# Patient Record
Sex: FEMALE | Race: WHITE | Employment: UNEMPLOYED | URBAN - METROPOLITAN AREA
[De-identification: names, ages, dates, MRNs, and addresses within clinical notes are randomized per-mention and may not be internally consistent; named-entity substitution may affect disease eponyms.]

---

## 2022-02-16 ENCOUNTER — HOSPITAL ENCOUNTER (OUTPATIENT)
Dept: PHYSICAL THERAPY | Age: 27
Discharge: HOME | End: 2022-02-16
Payer: OTHER GOVERNMENT

## 2022-02-16 PROCEDURE — 97535 SELF CARE MNGMENT TRAINING: CPT

## 2022-02-16 PROCEDURE — 97162 PT EVAL MOD COMPLEX 30 MIN: CPT

## 2022-02-16 NOTE — PROGRESS NOTES
[facility]  [address] [place], [address]  Ph [phone]  Fx [phone]    Plan of Care/ Statement of Necessity for Physical Therapy Services    Patient name: Juan Carlos Bennett Start of Care: 2022   Referral source: Lena Cook FNP-C : 1995    Medical Diagnosis: Stress incontinence (female) (male) [N39.3]   Onset Date:approximately 4 months ago    Treatment Diagnosis: Stress incontinence                                            ICD-10: N39.3   Prior Hospitalization: see medical history Provider#: [id number]   Medications: Verified on Patient summary List    Comorbidities: 3 pregnancies/ 2 vaginal (dtr 4 and son 18 months) and episiotomy, currently 20 weeks into 3rd pregnancies, wisdom teeth removal,    Prior Level of Function: Active lifestyle no urinary issues      The Plan of Care and following information is based on the information from the initial evaluation. Assessment/ key information: Patient is a 31year old female presenting to Physical Therapy with c/o stress and possible urge urinary incontinence which is limiting ability function at previous level. Patient has minimal  pain complaints with palpation to left SIJ. Patient is currently 20 weeks into her third pregnancy. Patient presents with decreased strength of postural stabilizers. Patient presents with fair understanding of bladder and bowel anatomy/function, dietary irritants, and urge suppression. I feel patient would benefit from skilled therapeutic intervention to optimize highest functional level possible.        Evaluation Complexity History HIGH Complexity :3+ comorbidities / personal factors will impact the outcome/ POC ; Examination HIGH Complexity : 4+ Standardized tests and measures addressing body structure, function, activity limitation and / or participation in recreation  ;Presentation MEDIUM Complexity : Evolving with changing characteristics  ; Clinical Decision Making MEDIUM Complexity : FOTO score of 26-74 : FOTO score = an established functional score where 100 = no disability  Overall Complexity Rating: MEDIUM    Problem List: decrease ROM, decrease strength, decrease ADL/ functional abilitiies, decrease activity tolerance and decrease flexibility/ joint mobility   Treatment Plan may include any combination of the following: Therapeutic exercise, Therapeutic activities, Neuromuscular re-education, Physical agent/modality, Gait/balance training, Manual therapy, Patient education, Self Care training and Functional mobility training  Patient / Family readiness to learn indicated by: asking questions, trying to perform skills and interest  Persons(s) to be included in education: patient (P)  Barriers to Learning/Limitations: None  Measures taken if barriers to learning: none  Patient Goal (s): not feel that I'm going to constantly pee on myself  Patient Self Reported Health Status: excellent  Rehabilitation Potential: excellent    Short term goals: To be achieved in 4 weeks:  Patient will demonstrate proper dietary/fluid habits and urge suppression strategies that promote bladder and bowel health to aid in management of urinary urgency and incontinence. Status at eval: Patient consuming sufficient water but some irritants and unaware of bladder fitness, dietary irritants and urge suppression strategies.      Patient will report improvement in UI complaints evidence by a decrease in pad usage and/or amount of leakage by 50% to improve QOL. Status at eval: Patient using 2-3 pads (incontinence) per day, generally drop or two but occasionally wet  (amount of leakage).      Patient will be able to maintain pelvic floor contraction for 5 seconds, 5 repetitions with no accessory substitution or breath holding. Status at eval:Deferred to next appointment     s:  To be achieved in 5 weeks:  Patient will report bladder continence 75% of the time with cough/sneeze/laugh and walking to the toilet.   Status at eval: patient stress and urgency incontinence 3 times  per day     Patient will demonstrate the ability to maintain a pelvic floor contraction  for 10 seconds, 10 repetitions. Tested externally at the levator ani  Status at eval: deferred to the next appointment     Patient will demonstrate improvement of current complaints evidenced by a 13 point  improvement in FOTO, Pelvic functional disability index score  Status at Eval: Pelvic functional disability index 51     Patient will demonstrate independence with management tools and exercise program that are beneficial for current condition in order to feel comfortable with Pelvic floor PT D/C and not fear exacerbation of current condition or symptoms returning. Status at eval : patient  unaware of what activities to avoid to avoid exacerbation of current condition       Frequency / Duration: Patient to be seen 1 times per week for 12 weeks. Patient/ Caregiver education and instruction: Diagnosis, prognosis, self care, activity modification and exercises   [x]  Plan of care has been reviewed with PTA    Certification Period: demian Hooker, PT 2/16/2022 3:38 PM    ________________________________________________________________________    I certify that the above Therapy Services are being furnished while the patient is under my care. I agree with the treatment plan and certify that this therapy is necessary.     Physician's Signature:_____________________Date:____________TIME:________                                      FLORENCIO Dior      ** Signature, Date and Time must be completed for valid certification **  Please sign and return to [facility] [address]  [facility], [address]  Ph [phone]  Fx [phone]

## 2022-02-16 NOTE — PROGRESS NOTES
Physical Therapy Evaluation        Patient Name: Ramiro Kimbrough  Date:2022  : 1995  [x]  Patient  Verified  Payor:  / Plan: [facility] REGION / Product Type: [employer]ANDN[employer] /    In time:1015  Out time:1100  Total Treatment Time (min): 39  Visit #: 1 of 12    Medicare/BCBS Only   Total Timed Codes (min):  15 1:1 Treatment Time:  30       Treatment Area: Stress incontinence (female) (male) [N39.3]    SUBJECTIVE  Pain Level (0-10 scale): 0/10  []constant []intermittent []improving []worsening []no change since onset    Any medication changes, allergies to medications, adverse drug reactions, diagnosis change, or new procedure performed?: [x] No    [] Yes (see summary sheet for update)  Subjective functional status/changes:     PLOF:  Active lifestyle no urinary issues  Limitations to PLOF:  Feels like she needs to wear an incontinence pad in public, concern about not knowning where the bathrooms are, irritated vaginal tissue from wiping  Mechanism of Injury: pregnancy  Current symptoms/Complaints: constant feeling that she is going to urinate, leaks with laugh/sneeze/cough and lifting; isn't able to fully void  Previous Treatment/Compliance: no  PMHx/Surgical Hx: 3 pregnancies/ 2 vaginal (dtr 4 and son 18 months) and episiotomy, currently 20 weeks into 3rd pregnancies, wisdom teeth removal,   Work Hx: stays home with the child  Living Situation: lives with  and two children  Pt Goals: not feel that I'm going to constantly pee on myself  Barriers: []pain []financial []time []transportation []other  Motivation: very  Substance use: []Alcohol []Tobacco []other:   Cognition: A & O x 4   Other:    Current urinary complaint  leaks with activity (stress induced)  leaks with urgency  leaks during sleep  urinary frequency  urinary urgency  sensation of incomplete bladder emptying intermittently  stress incontinence  urge incontinence    Bladder complaint longevity:  less than 6 months    Bladder symptom progression:  worsened    Frequency of UI: 3 times per day    Pad use:  incontinence pads: 2- 3 per day    Pad wetness when changed:  drop or two, sometimes wet    Daytime urinary frequency:  Every 1 hour(s) during the day    Nocturia:  2-3x/ night    Patient has failed previous pelvic floor muscle training?   [] Yes    [x] No    Bowel function:  Regular BM, 5-7 per week  Stool Type (Bradford): 3-4  Toileting position/modifications: no    Fecal Incontinence present? no    Pain complaint:  vaginal pain: opening (interoitus) during intercourse    Pain scale:  3-6/10    Pain description:  irritation due to frequent urination and need to wipe    Diet: healthy diet close to the My Plate    Fluid intake:    Fluid  Amount per day   Water   80 ounces   Caffeine  occasional sweet tea   Alcohol  none    Milk  16 ounces         Physical Exam Objective Findings:    Gait:  [x] Normal     [] Abnormal:    Active Movements: [] N/A   [] Too acute   [] Other:  ROM % AROM Comments:pain, area   Forward flexion 40-60 WNL    Extension 20-30 WNL    SideBend right 20-30 WNL    SideBend left 20-30 WNL    Rotation right 5-10 WNL    Rotation left 5-10 WNL          Neuro Screen [x] WNL    Dural Mobility:  SLR Supine: [] R    [] L    [] +    [x] -  @ (degrees):       Palpation  [x] Min  [] Mod  [] Severe    Location:SI joint on left      Strength   L(0-5) R (0-5) N/T   Hip Flexion (L1,2) 3+ 4 []   Knee Extension (L3,4) 5 5 []   Ankle Dorsiflexion (L4) 5 5 []   Great Toe Extension (L5) 5 5 []   Ankle Plantarflexion (S1) 5 5 []   Knee Flexion (S1,2) 5 5 []   Abdominals   [x]   Paraspinals   [x]   Back Rotators   [x]   Gluteus Thompson 4 4 []   Hip Abduction 4 4 []         Special Tests  SLS:  30 sec Bilateral LE  ASLR:  0/10    [place]:      Compression: [] +    [x] -     Gapping:  [] +    [x] -     Thigh Thrust: [] R    [] L    [] +    [x] -       Hip:  Gregor:  [] R    [] L    [] +    [x] -     FADIR  [] R    [] L    [] +    [x] - Piriformis: [] R    [] L    [] +    [x] -           30 min [x]Eval                  []Re-Eval       15 min Self Care: Review and handout provided on the following: PFM anatomy, structure and function as it pertains to current s/s, complaints and condition. Reviewed expectations for PFPT and POC. Rationale:  Increase awareness and understanding of current condition to improve patients ability to independently and effectively attain goals and progress towards long term management of current condition. With   [] TE   [] TA   [] neuro   [] other: Patient Education: [x] Review HEP    [] Progressed/Changed HEP based on:   [] positioning   [] body mechanics   [] transfers   [] heat/ice application    [] other:           Pain Level (0-10 scale) post treatment: 0/10    ASSESSMENT/Changes in Function: Patient is a 31year old female presenting to Physical Therapy with c/o stress and possible urge urinary incontinence which is limiting ability function at previous level. Patient has minimal  pain complaints with palpation to left SIJ. Patient is currently 20 weeks into her third pregnancy. Patient presents with decreased strength of postural stabilizers. Patient presents with fair understanding of bladder and bowel anatomy/function, dietary irritants, and urge suppression. I feel patient would benefit from skilled therapeutic intervention to optimize highest functional level possible. Patient will continue to benefit from skilled PT services to modify and progress therapeutic interventions, address functional mobility deficits, address ROM deficits, address strength deficits, analyze and address soft tissue restrictions and analyze and cue movement patterns to attain remaining goals. [x]  See Plan of Care  []  See progress note/recertification  []  See Discharge Summary         Progress towards goals / Updated goals:  Short term goals:  To be achieved in 4 weeks:  Patient will demonstrate proper dietary/fluid habits and urge suppression strategies that promote bladder and bowel health to aid in management of urinary urgency and incontinence. Status at eval: Patient consuming sufficient water but some irritants and unaware of bladder fitness, dietary irritants and urge suppression strategies. Patient will report improvement in UI complaints evidence by a decrease in pad usage and/or amount of leakage by 50% to improve QOL. Status at eval: Patient using 2-3 pads (incontinence) per day, generally drop or two but occasionally wet  (amount of leakage). Patient will be able to maintain pelvic floor contraction for 5 seconds, 5 repetitions with no accessory substitution or breath holding. Status at eval:Deferred to next appointment    s: To be achieved in 5 weeks:  Patient will report bladder continence 75% of the time with cough/sneeze/laugh and walking to the toilet. Status at eval: patient stress and urgency incontinence 3 times  per day    Patient will demonstrate the ability to maintain a pelvic floor contraction  for 10 seconds, 10 repetitions. Tested externally at the levator ani  Status at eval: deferred to the next appointment    Patient will demonstrate improvement of current complaints evidenced by a 13 point  improvement in FOTO, Pelvic functional disability index score  Status at Eval: Pelvic functional disability index 51    Patient will demonstrate independence with management tools and exercise program that are beneficial for current condition in order to feel comfortable with Pelvic floor PT D/C and not fear exacerbation of current condition or symptoms returning.    Status at eval : patient  unaware of what activities to avoid to avoid exacerbation of current condition    PLAN  []  Upgrade activities as tolerated     [x]  Continue plan of care  []  Update interventions per flow sheet       []  Discharge due to:_  []  Other:_      Alpha Wing, PT 2/16/2022  10:19 AM

## 2022-02-25 ENCOUNTER — HOSPITAL ENCOUNTER (OUTPATIENT)
Dept: PHYSICAL THERAPY | Age: 27
Discharge: HOME | End: 2022-02-25
Payer: OTHER GOVERNMENT

## 2022-02-25 PROCEDURE — 97112 NEUROMUSCULAR REEDUCATION: CPT

## 2022-02-25 PROCEDURE — 97110 THERAPEUTIC EXERCISES: CPT

## 2022-02-25 NOTE — PROGRESS NOTES
PF DAILY TREATMENT NOTE 10-18    Patient Name: Fabian Johnson  Date:2022  : 1995  [x]  Patient  Verified  Payor:  / Plan: [facility]  [facility] REGION / Product Type:  /    In time: 379  Out time:845  Total Treatment Time (min): 43  Visit #: 2 of 12    Medicare/BCBS Only   Total Timed Codes (min):  45 1:1 Treatment Time:  45     Treatment Area: [x] Pelvic Floor     [] Other:    SUBJECTIVE  Pain Level (0-10 scale): 0/10  Any medication changes, allergies to medications, adverse drug reactions, diagnosis change, or new procedure performed?: [x] No    [] Yes (see summary sheet for update)  Subjective functional status/changes:   [x] No changes reported      OBJECTIVE      35 min Therapeutic Exercise:  [] See flow sheet :   [x]  Pelvic floor strengthening                 []  Pelvic floor downtraining  [x]  Quality pelvic floor contractions       []  Relaxation techniques  []  Urge suppression exercises  []  Other:  Rationale: increase strength and improve coordination  to improve the patients ability to maintain continence         8 min Neuromuscular Re-education:  []  See flow sheet :  External assessment of pelvic floor contractions    [x]  Pelvic floor strengthening                 []  Pelvic floor downtraining  [x]  Quality pelvic floor contractions       []  Relaxation techniques  [x]  Urge suppression exercises  []  Other:  Rationale: increase ROM and increase strength  to improve the patients ability to increase continence              With   [] TE   [] TA   [] neuro  [] manual   [] other: Patient Education: [x] Review HEP    [] Progressed/Changed HEP based on:   [] positioning   [] body mechanics   [] transfers   [] heat/ice application    [] other:          Pain Level (0-10 scale) post treatment: 0/10    ASSESSMENT/Changes in Function:  Patient tolerated today's session well with no c/o pain.  Patient demonstrated understanding of today's instruction, education, and recommendations. Patient is progressing appropriately towards goals. External PF assessment of Kegel was performed evaluating the levator ani at the ischiorectal fossa. Patient was given HEP for Kegels and overflow exercises to strengthen the pelvic floor. []  Decrease # of leaks   [] No change []  Improving [] Resolved     []  Decrease hypertonus [] No change []  Improving [] Resolved     []  Increase void interval [] No change []  Improving [] Resolved     []  Increase PF strength [] No change []  Improving [] Resolved     []  Increase PF endurance [] No change []  Improving [] Resolved     []  Increase endurance [] No change []  Improving [] Resolved     []  Decrease # of pads [] No change []  Improving [] Resolved     []  Decrease pain [] No change []  Improving [] Resolved     []  Increased coordination [] No change []  Improving [] Resolved     []  Increased Bowel Frequency [] No change []  Improving [] Resolved       Patient will continue to benefit from skilled PT services to modify and progress therapeutic interventions, address functional mobility deficits, address ROM deficits, address strength deficits, analyze and address soft tissue restrictions and analyze and cue movement patterns to attain remaining goals. []  See Plan of Care  []  See progress note/recertification  []  See Discharge Summary         Progress towards goals / Updated goals:  Short term goals: To be achieved in 4 weeks:  Patient will demonstrate proper dietary/fluid habits and urge suppression strategies that promote bladder and bowel health to aid in management of urinary urgency and incontinence. Status at eval: Patient consuming sufficient water but some irritants and unaware of bladder fitness, dietary irritants and urge suppression strategies.      Patient will report improvement in UI complaints evidence by a decrease in pad usage and/or amount of leakage by 50% to improve QOL.   Status at eval: Patient using 2-3 pads (incontinence) per day, generally drop or two but occasionally wet  (amount of leakage).      Patient will be able to maintain pelvic floor contraction for 5 seconds, 5 repetitions with no accessory substitution or breath holding. Status at eval:Deferred to next appointment  Current:  External (at ischiorectal fossa) 10 seconds for 5 contractions with gluteal substitution  2/25/2022  Progressing      s: To be achieved in 5 weeks:  Patient will report bladder continence 75% of the time with cough/sneeze/laugh and walking to the toilet.   Status at eval: patient stress and urgency incontinence 3 times  per day     Patient will demonstrate the ability to maintain a pelvic floor contraction  for 10 seconds, 10 repetitions. Tested externally at the levator ani  Status at eval: deferred to the next appointment  Current:  External (at ischiorectal fossa) 10 seconds for 5 contractions with gluteal substitution  2/25/2022  Progressing      Patient will demonstrate improvement of current complaints evidenced by a 13 point  improvement in FOTO, Pelvic functional disability index score  Status at Eval: Pelvic functional disability index 51     Patient will demonstrate independence with management tools and exercise program that are beneficial for current condition in order to feel comfortable with Pelvic floor PT D/C and not fear exacerbation of current condition or symptoms returning.    Status at eval : patient  unaware of what activities to avoid to avoid exacerbation of current condition    PLAN  []  Upgrade activities as tolerated     []  Continue plan of care  []  Update interventions per flow sheet       []  Discharge due to:_  []  Other:_      Danielle Muller, PT 2/25/2022  7:57 AM    Future Appointments   Date Time Provider Wilton Agarwal   2/25/2022  8:00 AM Neva Phillips PT [facility]   3/4/2022  3:30 PM Tam Mendieta [facility]   3/11/2022  3:30 PM Neva Phillips, [address] [facility]   3/18/2022  3:30 PM Hillary [address] [facility]   3/25/2022  3:30 PM Linn Berman, [address] [facility]   3/30/2022  5:00 PM Linn Berman, PT [facility]

## 2022-03-04 ENCOUNTER — HOSPITAL ENCOUNTER (OUTPATIENT)
Dept: PHYSICAL THERAPY | Age: 27
Discharge: HOME | End: 2022-03-04
Payer: OTHER GOVERNMENT

## 2022-03-04 PROCEDURE — 97110 THERAPEUTIC EXERCISES: CPT

## 2022-03-04 PROCEDURE — 97535 SELF CARE MNGMENT TRAINING: CPT

## 2022-03-04 PROCEDURE — 97112 NEUROMUSCULAR REEDUCATION: CPT

## 2022-03-04 PROCEDURE — 97530 THERAPEUTIC ACTIVITIES: CPT

## 2022-03-04 NOTE — PROGRESS NOTES
PF DAILY TREATMENT NOTE 10-18    Patient Name: Monty Oh  Date:3/4/2022  : 1995  [x]  Patient  Verified  Payor:  / Plan: [facility] / Product Type:  /    In time:3:37 PM  Out time:4:17 PM  Total Treatment Time (min): 40  Visit #: 3 of 12    Medicare/BCBS Only   Total Timed Codes (min):  40 1:1 Treatment Time:       Treatment Area: [] Pelvic Floor     [] Other:    SUBJECTIVE  Pain Level (0-10 scale): 0  Any medication changes, allergies to medications, adverse drug reactions, diagnosis change, or new procedure performed?: [x] No    [] Yes (see summary sheet for update)  Subjective functional status/changes:   [] No changes reported  Patient reports no new complaints.      OBJECTIVE    10 min Therapeutic Exercise:  [x] See flow sheet :   []  Pelvic floor strengthening                 []  Pelvic floor downtraining  []  Quality pelvic floor contractions       []  Relaxation techniques  []  Urge suppression exercises  []  Other:  Rationale: increase ROM, increase strength and improve coordination  to improve the patients ability to increase ease with ADLs       10 min Therapeutic Activity:  [x]  See flow sheet :    []  Increase Tissue extensibility        []  Assess fiber intake    []  Assess voiding habits  []  Assess bowel habits  []  Other:   Rationale: increase strength and improve coordination  to improve the patients ability to improve ease with ADLs      10 min Neuromuscular Re-education:  [x]  See flow sheet :   [x]  Pelvic floor strengthening                 []  Pelvic floor downtraining  [x]  Quality pelvic floor contractions       []  Relaxation techniques  []  Urge suppression exercises  []  Other:  Rationale: increase ROM, increase strength, improve coordination, improve balance and increase proprioception  to improve the patients ability to improve pelvic floor awareness and coordination      10 min Bladder Training / Self Care:  -review of urge suppression [] voiding schedule          [] program progression  [] decrease every  minutes/hours           With   [] TE   [] TA   [] neuro  [] manual   [] other: Patient Education: [x] Review HEP    [] Progressed/Changed HEP based on:   [] positioning   [] body mechanics   [] transfers   [] heat/ice application    [] other:      Other Objective/Functional Measures:   -significant trunk lean noted left LE march on swiss ball    Pain Level (0-10 scale) post treatment: 0    ASSESSMENT/Changes in Function:   Patient with poor lumbopelvic mobility and stability. Patient does well with added activities as per flowsheet and denies leakage during session today. Patient will continue to benefit from skilled PT services to modify and progress therapeutic interventions, address functional mobility deficits, address ROM deficits, address strength deficits, analyze and address soft tissue restrictions, analyze and cue movement patterns, analyze and modify body mechanics/ergonomics, assess and modify postural abnormalities and instruct in home and community integration to attain remaining goals. []  See Plan of Care  []  See progress note/recertification  []  See Discharge Summary         Progress towards goals / Updated goals:  Short term goals: To be achieved in 4 weeks:  Patient will demonstrate proper dietary/fluid habits and urge suppression strategies that promote bladder and bowel health to aid in management of urinary urgency and incontinence. Status at eval: Patient consuming sufficient water but some irritants and unaware of bladder fitness, dietary irritants and urge suppression strategies. 3/4/22:  MET, patient reports sufficient water intake      Patient will report improvement in UI complaints evidence by a decrease in pad usage and/or amount of leakage by 50% to improve QOL. Status at eval: Patient using 2-3 pads (incontinence) per day, generally drop or two but occasionally wet  (amount of leakage).    Patient will be able to maintain pelvic floor contraction for 5 seconds, 5 repetitions with no accessory substitution or breath holding. Status at eval:Deferred to next appointment  Current:  External (at ischiorectal fossa) 10 seconds for 5 contractions with gluteal substitution  2/25/2022  Progressing      s: To be achieved in 5 weeks:  Patient will report bladder continence 75% of the time with cough/sneeze/laugh and walking to the toilet.   Status at eval: patient stress and urgency incontinence 3 times  per day     Patient will demonstrate the ability to maintain a pelvic floor contraction  for 10 seconds, 10 repetitions. Tested externally at the levator ani  Status at eval: deferred to the next appointment  Current:  External (at ischiorectal fossa) 10 seconds for 5 contractions with gluteal substitution  2/25/2022  Progressing      Patient will demonstrate improvement of current complaints evidenced by a 13 point  improvement in FOTO, Pelvic functional disability index score  Status at Eval: Pelvic functional disability index 51     Patient will demonstrate independence with management tools and exercise program that are beneficial for current condition in order to feel comfortable with Pelvic floor PT D/C and not fear exacerbation of current condition or symptoms returning.    Status at eval : patient  unaware of what activities to avoid to avoid exacerbation of current condition    PLAN  []  Upgrade activities as tolerated     [x]  Continue plan of care  []  Update interventions per flow sheet       []  Discharge due to:_  []  Other:_      Imani Yanez 3/4/2022  8:24 AM    Future Appointments   Date Time Provider Wilton Agarwal   3/4/2022  3:30 PM [facility]   3/11/2022  3:30 PM Amalia Lambert, [address] [facility]   3/18/2022  3:30 PM [facility]   3/25/2022  3:30 PM Amalia Lambert, [address] [facility]   3/30/2022  5:00 PM Amalia Lambert PT [facility]

## 2022-03-11 ENCOUNTER — APPOINTMENT (OUTPATIENT)
Dept: PHYSICAL THERAPY | Age: 27
End: 2022-03-11
Payer: OTHER GOVERNMENT

## 2022-03-11 ENCOUNTER — TELEPHONE (OUTPATIENT)
Dept: PHYSICAL THERAPY | Age: 27
End: 2022-03-11

## 2022-03-18 ENCOUNTER — HOSPITAL ENCOUNTER (OUTPATIENT)
Dept: PHYSICAL THERAPY | Age: 27
Discharge: HOME | End: 2022-03-18
Payer: OTHER GOVERNMENT

## 2022-03-18 PROCEDURE — 97530 THERAPEUTIC ACTIVITIES: CPT

## 2022-03-18 PROCEDURE — 97535 SELF CARE MNGMENT TRAINING: CPT

## 2022-03-18 PROCEDURE — 97112 NEUROMUSCULAR REEDUCATION: CPT

## 2022-03-18 PROCEDURE — 97110 THERAPEUTIC EXERCISES: CPT

## 2022-03-18 NOTE — PROGRESS NOTES
[facility]  [address] Barbara Silva, [address]  Ph [phone]  Fx [phone]    Pelvic Floor Progress Note  Patient name: Tanika Cheng Start of Care: 2022   Referral source: Drea Messi FNP-C : 1995                Medical Diagnosis: Stress incontinence (female) (male) [N39.3]    Onset Date:approximately 4 months ago                Treatment Diagnosis: Stress incontinence                                            ICD-10: N39.3   Prior Hospitalization: see medical history Provider#: [id number]   Medications: Verified on Patient summary List    Comorbidities: 3 pregnancies/ 2 vaginal (dtr 4 and son 18 months) and episiotomy, currently 20 weeks into 3rd pregnancies, wisdom teeth removal,    Prior Level of Function: Active lifestyle no urinary issues  Visits from Start of Care: 4    Missed Visits: 1    Updated Goals/Measure of Progress: To be achieved in 8 weeks:  Short term goals: To be achieved in 4 weeks:  Patient will demonstrate proper dietary/fluid habits and urge suppression strategies that promote bladder and bowel health to aid in management of urinary urgency and incontinence. Status at eval: Patient consuming sufficient water but some irritants and unaware of bladder fitness, dietary irritants and urge suppression strategies.    3/4/22:  MET, patient reports sufficient water intake      Patient will report improvement in UI complaints evidence by a decrease in pad usage and/or amount of leakage by 50% to improve QOL. Status at eval: Patient using 2-3 pads (incontinence) per day, generally drop or two but occasionally wet  (amount of leakage). 3/18/22: PROGRESSING, patient reports 1-2 pantiliners per day     Patient will be able to maintain pelvic floor contraction for 5 seconds, 5 repetitions with no accessory substitution or breath holding.   Status at eval:Deferred to next appointment  Current:  External (at ischiorectal fossa) 10 seconds for 5 contractions with gluteal substitution  2/25/2022  Progressing   3/18/22: not assessed at this PN     s: To be achieved in 5 weeks:  Patient will report bladder continence 75% of the time with cough/sneeze/laugh and walking to the toilet.   Status at eval: patient stress and urgency incontinence 3 times  per day  3/18/22: MET, patient reports 75-80% continence     Patient will demonstrate the ability to maintain a pelvic floor contraction  for 10 seconds, 10 repetitions. Tested externally at the levator ani  Status at eval: deferred to the next appointment  Current:  External (at ischiorectal fossa) 10 seconds for 5 contractions with gluteal substitution  2/25/2022  Progressing   3/18/22: not assessed at this PN     Patient will demonstrate improvement of current complaints evidenced by a 13 point  improvement in FOTO, Pelvic functional disability index score  Status at Eval: Pelvic functional disability index 51  3/18/22: will assess at next PN, today is patient's fourth visit     Patient will demonstrate independence with management tools and exercise program that are beneficial for current condition in order to feel comfortable with Pelvic floor PT D/C and not fear exacerbation of current condition or symptoms returning. Status at eval : patient Lars Leep of what activities to avoid to avoid exacerbation of current condition  3/18/22: PROGRESSING, patient is being compliant with HEPs    Key Functional Changes:            Excellent Good         Limited           None  [] Increase Pelvic MM strength       []  []  []  []  [] Decrease Pelvic MM hypertonus     []  []  []  []  [] Decrease Incontinence Episodes    []  []  []  []   [x] Improve Voiding Habits        [x]  []  []  []   [] Decreased Urgency        []  []  []  []  [x] Decrease Pelvic Pain        []  [x]  []  []      ASSESSMENT/RECOMMENDATIONS:  Progress note done outside of the 30 day window due to scheduling conflict and appointment cancellations.  Patient reports 75-80% bladder continence. Patient also reports decreasing low back and SIJ pain. Patient does well with core focused activities today void of adverse effect. Patient will benefit from continued, skilled pelvic floor PT 1x8 weeks. [x]Continue therapy per initial plan/protocol at a frequency of  1 x per week for 8 weeks  []Continue therapy with the following recommended changes:_____________________      _____________________________________________________________________  []Discontinue therapy progressing towards or have reached established goals  []Discontinue therapy due to lack of appreciable progress towards goals  []Discontinue therapy due to lack of attendance or compliance  []Await Physician's recommendations/decisions regarding therapy  []Other:________________________________________________________________    Thank you for this referral.   Rios Record 3/18/2022 11:57 AM  NOTE TO PHYSICIAN:  [address] TO [facility] Physical Therapy: [phone]  If you are unable to process this request in 24 hours please contact our office: [phone]      ? I have read the above report and request that my patient continue as recommended. ? I have read the above report and request that my patient continue therapy with the following changes/special instructions:___________________________________________________________  ? I have read the above report and request that my patient be discharged from therapy.

## 2022-03-18 NOTE — PROGRESS NOTES
PF DAILY TREATMENT NOTE 10-18    Patient Name: Tanika Cheng  Date:3/18/2022  : 1995  [x]  Patient  Verified  Payor:  / Plan: [facility] REGION / Product Type:  /    In time:3:35 PM  Out time:4:15 PM  Total Treatment Time (min): 40  Visit #: 4 of 12    Medicare/BCBS Only   Total Timed Codes (min):  40 1:1 Treatment Time:       Treatment Area: [] Pelvic Floor     [] Other:    SUBJECTIVE  Pain Level (0-10 scale): 0  Any medication changes, allergies to medications, adverse drug reactions, diagnosis change, or new procedure performed?: [x] No    [] Yes (see summary sheet for update)  Subjective functional status/changes:   [] No changes reported  Patient reports that she missed last visit secondary to family having the stomach bug.      OBJECTIVE      10 min Therapeutic Exercise:  [x] See flow sheet :  -goal reassess with patient education trhoughout   []  Pelvic floor strengthening                 []  Pelvic floor downtraining  []  Quality pelvic floor contractions       []  Relaxation techniques  []  Urge suppression exercises  []  Other:  Rationale: increase ROM, increase strength and improve coordination  to improve the patients ability to increase ease with ADLs       10 min Therapeutic Activity:  [x]  See flow sheet :  -QP activity per flowsheet    []  Increase Tissue extensibility        []  Assess fiber intake    []  Assess voiding habits  []  Assess bowel habits  []  Other:   Rationale: increase strength, improve coordination, improve balance and increase proprioception  to improve the patients ability to improve ADL ease    10 min Bladder Training/ Self care:  -review of urge suppression    []  Increase Tissue extensibility        []  Assess fiber intake    []  Assess voiding habits  []  Assess bowel habits  []  Other:   Rationale: increase strength, improve coordination, improve balance and increase proprioception  to improve the patients ability to improve ADL ease      10 min Neuromuscular Re-education:  [x]  See flow sheet :   []  Pelvic floor strengthening                 []  Pelvic floor downtraining  []  Quality pelvic floor contractions       []  Relaxation techniques  []  Urge suppression exercises  []  Other:  Rationale: increase ROM, increase strength, improve coordination, improve balance and increase proprioception  to improve the patients ability to improve pelvic floor awareness and coordination        With   [] TE   [] TA   [] neuro  [] manual   [] other: Patient Education: [x] Review HEP    [] Progressed/Changed HEP based on:   [] positioning   [] body mechanics   [] transfers   [] heat/ice application    [] other:      Other Objective/Functional Measures:       Pain Level (0-10 scale) post treatment: 0    ASSESSMENT/Changes in Function:   Progress note done outside of the 30 day window due to scheduling conflict and appointment cancellations. Patient reports 75-80% bladder continence. Patient also reports decreasing low back and SIJ pain. Patient does well with core focused activities today void of adverse effect. Patient denies leaking during session today. Patient will benefit from continued, skilled pelvic floor PT 1x8 weeks. Patient will continue to benefit from skilled PT services to modify and progress therapeutic interventions, address functional mobility deficits, address ROM deficits, address strength deficits, analyze and address soft tissue restrictions, analyze and cue movement patterns, analyze and modify body mechanics/ergonomics, assess and modify postural abnormalities and instruct in home and community integration to attain remaining goals. []  See Plan of Care  [x]  See progress note/recertification  []  See Discharge Summary         Progress towards goals / Updated goals:  Short term goals:  To be achieved in 4 weeks:  Patient will demonstrate proper dietary/fluid habits and urge suppression strategies that promote bladder and bowel health to aid in management of urinary urgency and incontinence. Status at eval: Patient consuming sufficient water but some irritants and unaware of bladder fitness, dietary irritants and urge suppression strategies. 3/4/22:  MET, patient reports sufficient water intake      Patient will report improvement in UI complaints evidence by a decrease in pad usage and/or amount of leakage by 50% to improve QOL. Status at eval: Patient using 2-3 pads (incontinence) per day, generally drop or two but occasionally wet  (amount of leakage). 3/18/22: PROGRESSING, patient reports 1-2 pantiliners per day     Patient will be able to maintain pelvic floor contraction for 5 seconds, 5 repetitions with no accessory substitution or breath holding. Status at eval:Deferred to next appointment  Current:  External (at ischiorectal fossa) 10 seconds for 5 contractions with gluteal substitution  2/25/2022  Progressing   3/18/22: not assessed at this PN     s: To be achieved in 5 weeks:  Patient will report bladder continence 75% of the time with cough/sneeze/laugh and walking to the toilet.   Status at eval: patient stress and urgency incontinence 3 times  per day  3/18/22: MET, patient reports 75-80% continence     Patient will demonstrate the ability to maintain a pelvic floor contraction  for 10 seconds, 10 repetitions.  Tested externally at the levator ani  Status at eval: deferred to the next appointment  Current:  External (at ischiorectal fossa) 10 seconds for 5 contractions with gluteal substitution  2/25/2022  Progressing   3/18/22: not assessed at this PN     Patient will demonstrate improvement of current complaints evidenced by a 13 point  improvement in FOTO, Pelvic functional disability index score  Status at Eval: Pelvic functional disability index 51  3/18/22: will assess at next PN, today is patient's fourth visit     Patient will demonstrate independence with management tools and exercise program that are beneficial for current condition in order to feel comfortable with Pelvic floor PT D/C and not fear exacerbation of current condition or symptoms returning.    Status at eval : patient Hao Salmon of what activities to avoid to avoid exacerbation of current condition  3/18/22: PROGRESSING, patient is being compliant with HEPs    PLAN  []  Upgrade activities as tolerated     [x]  Continue plan of care  []  Update interventions per flow sheet       []  Discharge due to:_  []  Other:_      Maryjo De Paz 3/18/2022  11:55 AM    Future Appointments   Date Time Provider Wilton Agarwal   3/18/2022  3:30 PM Claudia Valdez [facility]   3/25/2022  3:30 PM Cornelia Heart, [address] [facility]   3/30/2022  5:00 PM Cornelia Heart, PT [facility]

## 2022-03-30 ENCOUNTER — HOSPITAL ENCOUNTER (OUTPATIENT)
Dept: PHYSICAL THERAPY | Age: 27
Discharge: HOME | End: 2022-03-30
Payer: OTHER GOVERNMENT

## 2022-03-30 PROCEDURE — 97535 SELF CARE MNGMENT TRAINING: CPT

## 2022-03-30 PROCEDURE — 97140 MANUAL THERAPY 1/> REGIONS: CPT

## 2022-03-30 PROCEDURE — 97110 THERAPEUTIC EXERCISES: CPT

## 2022-03-30 NOTE — PROGRESS NOTES
PF DAILY TREATMENT NOTE    Patient Name: Kim Arriaza  Date:3/30/2022  : 1995  [x]  Patient  Verified  Payor:  / Plan: [facility]  [facility] REGION / Product Type:  /    In time:500 Out time:550  Total Treatment Time (min): 50    For MC/BCBS only  Total Timed Codes (min): 50  Of Timed Code minutes, 1:1 Treatment Time: 48     Visit #: 6 of 12    Treatment Area: Stress incontinence (female) (male) [N39.3]    SUBJECTIVE  Pain Level (0-10 scale): 0/10  Any medication changes, allergies to medications, adverse drug reactions, diagnosis change, or new procedure performed?: [x] No    [] Yes (see summary sheet for update)  Subjective functional status/changes:   [x] No changes reported  Patient reports no pain. Patient reports one episode of UI due to a sneeze.       OBJECTIVE      25 min Therapeutic Exercise:  [x] See flow sheet :   [x]  Pelvic floor strengthening                 []  Pelvic floor downtraining  [x]  Quality pelvic floor contractions       []  Relaxation techniques  []  Urge suppression exercises  []  Other:  Rationale: increase ROM, increase strength and improve coordination  to improve the patients ability to maintain continence       15 min Manual Therapy:  Trigger point, MET for left SIJ   Rationale: decrease pain and increase ROM to allow her to care for her children with increased ease    10 min Self Care: HEP review and instructed on using a small ball (eg lacrosse) to perform self trigger point releases, dicussed perineal massage (too early)   Rationale:    increase ROM and decrease pain to improve the patients ability to allow her to perform  with reduced pain and to prepare her for child birth         With   [] TE   [] TA   [] neuro  [] manual  [] self care   [] other: Patient Education: [x] Review HEP    [] Progressed/Changed HEP based on:   [] positioning   [] body mechanics   [] transfers   [] heat/ice application    [] other:      Pain Level (0-10 scale) post treatment: 0/10    ASSESSMENT/Changes in Function: Patient tolerated today's session well with no c/o pain. Discussed potential dc from therapy soon. Patient was instructed on using a small ball to perform trigger point releases on herself. Patient was instructed on hip adductor stretches to prepare for child birth. Patient demonstrated understanding of today's instruction, education, and recommendations. Patient is progressing appropriately towards goals. Patient will continue to benefit from skilled PT services to modify and progress therapeutic interventions, address functional mobility deficits, address ROM deficits, address strength deficits, analyze and address soft tissue restrictions, analyze and cue movement patterns, analyze and modify body mechanics/ergonomics and assess and modify postural abnormalities to attain remaining goals. [x]  See Plan of Care  []  See progress note/recertification  []  See Discharge Summary         Progress towards goals / Updated goals:  Short term goals: To be achieved in 4 weeks:  Patient will demonstrate proper dietary/fluid habits and urge suppression strategies that promote bladder and bowel health to aid in management of urinary urgency and incontinence. Status at eval: Patient consuming sufficient water but some irritants and unaware of bladder fitness, dietary irritants and urge suppression strategies.    3/4/22:  MET, patient reports sufficient water intake      Patient will report improvement in UI complaints evidence by a decrease in pad usage and/or amount of leakage by 50% to improve QOL. Status at eval: Patient using 2-3 pads (incontinence) per day, generally drop or two but occasionally wet  (amount of leakage).    3/18/22: PROGRESSING, patient reports 1-2 pantiliners per day  3/25/22 Progressing Patient reports no major leakage this week and is no longer using pads   3/30/2022 Patient reports being about 80% better for UI complaints.   She reported one episode of UI after sneezing yesterday.       Patient will be able to maintain pelvic floor contraction for 5 seconds, 5 repetitions with no accessory substitution or breath holding. Status at eval:Deferred to next appointment  Current:  External (at ischiorectal fossa) 10 seconds for 5 contractions with gluteal substitution  2/25/2022  Progressing   3/18/22: not assessed at this PN     s: To be achieved in 5 weeks:  Patient will report bladder continence 75% of the time with cough/sneeze/laugh and walking to the toilet.   Status at eval: patient stress and urgency incontinence 3 times  per day  3/18/22: MET, patient reports 75-80% continence     Patient will demonstrate the ability to maintain a pelvic floor contraction  for 10 seconds, 10 repetitions. Tested externally at the levator ani  Status at eval: deferred to the next appointment  Current:  External (at ischiorectal fossa) 10 seconds for 5 contractions with gluteal substitution  2/25/2022  Progressing   3/18/22: not assessed at this PN     Patient will demonstrate improvement of current complaints evidenced by a 13 point  improvement in FOTO, Pelvic functional disability index score  Status at Eval: Pelvic functional disability index 51  3/18/22: will assess at next PN, today is patient's fourth visit     Patient will demonstrate independence with management tools and exercise program that are beneficial for current condition in order to feel comfortable with Pelvic floor PT D/C and not fear exacerbation of current condition or symptoms returning.    Status at eval : patient Darral Junie of what activities to avoid to avoid exacerbation of current condition  3/18/22: PROGRESSING, patient is being compliant with HEPs       PLAN  []  Upgrade activities as tolerated     []  Continue plan of care  []  Update interventions per flow sheet       []  Discharge due to:_  []  Other:_      Laura Whyte, PT 3/30/2022  5:04 PM    Future Appointments   Date Time Provider Wilton Agarwal   4/8/2022  2:00 PM [facility]   4/13/2022  4:15 PM Jaki Baker [address] [place] [address] [facility]   4/20/2022  3:30 PM [facility]   4/27/2022  4:15 PM Jaki Baker, [facility]

## 2022-04-08 ENCOUNTER — HOSPITAL ENCOUNTER (OUTPATIENT)
Dept: PHYSICAL THERAPY | Age: 27
Discharge: HOME | End: 2022-04-08
Payer: OTHER GOVERNMENT

## 2022-04-08 PROCEDURE — 97530 THERAPEUTIC ACTIVITIES: CPT

## 2022-04-08 PROCEDURE — 97110 THERAPEUTIC EXERCISES: CPT

## 2022-04-08 PROCEDURE — 97140 MANUAL THERAPY 1/> REGIONS: CPT

## 2022-04-08 PROCEDURE — 97112 NEUROMUSCULAR REEDUCATION: CPT

## 2022-04-13 ENCOUNTER — HOSPITAL ENCOUNTER (OUTPATIENT)
Dept: PHYSICAL THERAPY | Age: 27
Discharge: HOME | End: 2022-04-13
Payer: OTHER GOVERNMENT

## 2022-04-13 PROCEDURE — 97140 MANUAL THERAPY 1/> REGIONS: CPT

## 2022-04-13 PROCEDURE — 97110 THERAPEUTIC EXERCISES: CPT

## 2022-04-13 PROCEDURE — 97530 THERAPEUTIC ACTIVITIES: CPT

## 2022-04-13 NOTE — PROGRESS NOTES
[facility]  [address] [place], [address]  Ph [phone]  Fx [phone]    Physical Therapy Progress Note  Patient name: Milly Santos Start of Care: 2/16/2022   Referral source: Cece Tatum FNP-C WQF: 90/64/3147                Medical Diagnosis: Stress incontinence (female) (male) [N39.3]    Onset Date:approximately 4 months ago                Treatment Diagnosis: Stress incontinence                                            ICD-10: N39.3   Prior Hospitalization: see medical history Provider#: [id number]   Medications: Verified on Patient summary List    Comorbidities: 3 pregnancies/ 2 vaginal (dtr 4 and son 18 months) and episiotomy, currently 20 weeks into 3rd pregnancies, wisdom teeth removal,    Prior Level of Function: Active lifestyle no urinary issues        Visits from Start of Care: 8    Missed Visits: 1    Updated Goals/Measure of Progress: To be achieved in 4 weeks:  Short term goals: To be achieved in 4 weeks:  Patient will demonstrate proper dietary/fluid habits and urge suppression strategies that promote bladder and bowel health to aid in management of urinary urgency and incontinence. Status at eval: Patient consuming sufficient water but some irritants and unaware of bladder fitness, dietary irritants and urge suppression strategies.    3/4/22:  MET, patient reports sufficient water intake      Patient will report improvement in UI complaints evidence by a decrease in pad usage and/or amount of leakage by 50% to improve QOL. Status at eval: Patient using 2-3 pads (incontinence) per day, generally drop or two but occasionally wet  (amount of leakage).    3/18/22: PROGRESSING, patient reports 1-2 pantiliners per day  3/25/22 Progressing Patient reports no major leakage this week and is no longer using pads   3/30/2022 Patient reports being about 80% better for UI complaints.  She reported one episode of UI after sneezing yesterday  4/8/22: nearly met, patient reports minimal leakage   4/13/22 UI with sneezing only. Nearly met     Patient will be able to maintain pelvic floor contraction for 5 seconds, 5 repetitions with no accessory substitution or breath holding. Status at eval:Deferred to next appointment  Current:  External (at ischiorectal fossa) 10 seconds for 5 contractions with gluteal substitution  2/25/2022  Progressing   3/18/22: not assessed at this PN  4/13/22 Patient deferred D/C goal     s: To be achieved in 5 weeks:  Patient will report bladder continence 75% of the time with cough/sneeze/laugh and walking to the toilet.   Status at eval: patient stress and urgency incontinence 3 times  per day  3/18/22: MET, patient reports 75-80% continence     Patient will demonstrate the ability to maintain a pelvic floor contraction  for 10 seconds, 10 repetitions. Tested externally at the levator ani  Status at eval: deferred to the next appointment  Current:  External (at ischiorectal fossa) 10 seconds for 5 contractions with gluteal substitution  2/25/2022  Progressing   3/18/22: not assessed at this PN  4/13/22 Patient deferred D/C goal     Patient will demonstrate improvement of current complaints evidenced by a 13 point  improvement in FOTO, Pelvic functional disability index score  Status at [facility]: Pelvic functional disability index 51  3/18/22: will assess at next PN, today is patient's fourth visit  4/13/2022  68 GOAL MET D/C GOAL     Patient will demonstrate independence with management tools and exercise program that are beneficial for current condition in order to feel comfortable with Pelvic floor PT D/C and not fear exacerbation of current condition or symptoms returning. Status at eval : patient Oleg Allen of what activities to avoid to avoid exacerbation of current condition  3/18/22: PROGRESSING, patient is being compliant with HEPs  4/13/2022 Progressing . Patient reports to being compliant with the HEP.   PROGRESSING       Summary of Care/ Key Functional Changes: Patient is a 31year old female who is 27 weeks into her third pregnancy. Patient has attended eight pelvic floor physical therapy appointments for stress incontinence. She has progressed very well and reports no longer having UI when lifting her son. She reports occasional UI with a sneeze. Patient will benefit from continued physical therapy to address the remaining deficits.     ASSESSMENT/RECOMMENDATIONS:  [x]Continue therapy per initial plan/protocol at a frequency of  1 x per week for 4 weeks  []Continue therapy with the following recommended changes:_____________________ _____________________________ ________________________________________  []Discontinue therapy progressing towards or have reached established goals  []Discontinue therapy due to lack of appreciable progress towards goals  []Discontinue therapy due to lack of attendance or compliance  []Await Physician's recommendations/decisions regarding therapy  []Other:________________________________________________________________    Thank you for this referral.   Gurvinder Carbajal, PT 4/13/2022 4:49 PM

## 2022-04-13 NOTE — PROGRESS NOTES
PF DAILY TREATMENT NOTE    Patient Name: Dylon Kendall  Date:2022  : 1995  [x]  Patient  Verified  Payor:  / Plan: [facility] / Product Type:  /    In time:418  Out time:502  Total Treatment Time (min): 44    For MC/BCBS only  Total Timed Codes (min): 44  Of Timed Code minutes, 1:1 Treatment Time: 40    Visit #: 8 of 12    Treatment Area: Stress incontinence (female) (male) [N39.3]    SUBJECTIVE  Pain Level (0-10 scale): 0/10  Any medication changes, allergies to medications, adverse drug reactions, diagnosis change, or new procedure performed?: [x] No    [] Yes (see summary sheet for update)  Subjective functional status/changes:   [x] No changes reported    Patient reports being able to lift her son without leakage. OBJECTIVE    26 min Therapeutic Exercise:  [x] See flow sheet :   [x]  Pelvic floor strengthening                 []  Pelvic floor downtraining  [x]  Quality pelvic floor contractions       []  Relaxation techniques  []  Urge suppression exercises  []  Other:  Rationale: increase strength  to improve the patients ability to maintain continence       8 min Therapeutic Activity:  []  See flow sheet :    []  Increase Tissue extensibility        []  Assess fiber intake    []  Assess voiding habits  []  Assess bowel habits  []  Other: Core stability with activity   Rationale:  to increase strength of transversus abdominus to increase co activation of the pelvic floor musculature for increase lumbopelvic stabilization. 10 min Manual Therapy:  Trigger points on the left   Rationale: decrease pain and decrease trigger points to increase ease when performing .          With   [] TE   [] TA   [] neuro  [] manual  [] self care   [] other: Patient Education: [x] Review HEP    [] Progressed/Changed HEP based on:   [] positioning   [] body mechanics   [] transfers   [] heat/ice application    [] other:          Pain Level (0-10 scale) post treatment:  0/10  ASSESSMENT/Changes in Function: Patient is a 31year old female who is 27 weeks into her third pregnancy. Patient has attended eight pelvic floor physical therapy appointments for stress incontinence. She has progressed very well and reports no longer having UI when lifting her son. She reports occasional UI with a sneeze. Patient will benefit from continued physical therapy to address the remaining deficits. [x]  Decrease # of leaks   [] No change [x]  Improving [] Resolved       Patient will continue to benefit from skilled PT services to modify and progress therapeutic interventions, address functional mobility deficits, address strength deficits, analyze and cue movement patterns and analyze and modify body mechanics/ergonomics to attain remaining goals. []  See Plan of Care  []  See progress note/recertification  []  See Discharge Summary         Progress towards goals / Updated goals:  Short term goals: To be achieved in 4 weeks:  Patient will demonstrate proper dietary/fluid habits and urge suppression strategies that promote bladder and bowel health to aid in management of urinary urgency and incontinence. Status at eval: Patient consuming sufficient water but some irritants and unaware of bladder fitness, dietary irritants and urge suppression strategies.    3/4/22:  MET, patient reports sufficient water intake      Patient will report improvement in UI complaints evidence by a decrease in pad usage and/or amount of leakage by 50% to improve QOL. Status at eval: Patient using 2-3 pads (incontinence) per day, generally drop or two but occasionally wet  (amount of leakage).    3/18/22: PROGRESSING, patient reports 1-2 pantiliners per day  3/25/22 Progressing Patient reports no major leakage this week and is no longer using pads   3/30/2022 Patient reports being about 80% better for UI complaints.  She reported one episode of UI after sneezing yesterday  4/8/22: nearly met, patient reports minimal leakage   4/13/22 UI with sneezing only. Nearly met     Patient will be able to maintain pelvic floor contraction for 5 seconds, 5 repetitions with no accessory substitution or breath holding. Status at eval:Deferred to next appointment  Current:  External (at ischiorectal fossa) 10 seconds for 5 contractions with gluteal substitution  2/25/2022  Progressing   3/18/22: not assessed at this PN  4/13/22 Patient deferred D/C goal     s: To be achieved in 5 weeks:  Patient will report bladder continence 75% of the time with cough/sneeze/laugh and walking to the toilet.   Status at eval: patient stress and urgency incontinence 3 times  per day  3/18/22: MET, patient reports 75-80% continence     Patient will demonstrate the ability to maintain a pelvic floor contraction  for 10 seconds, 10 repetitions. Tested externally at the levator ani  Status at eval: deferred to the next appointment  Current:  External (at ischiorectal fossa) 10 seconds for 5 contractions with gluteal substitution  2/25/2022  Progressing   3/18/22: not assessed at this PN  4/13/22 Patient deferred D/C goal     Patient will demonstrate improvement of current complaints evidenced by a 13 point  improvement in FOTO, Pelvic functional disability index score  Status at Eval: Pelvic functional disability index 51  3/18/22: will assess at next PN, today is patient's fourth visit  4/13/2022  68 GOAL MET D/C GOAL     Patient will demonstrate independence with management tools and exercise program that are beneficial for current condition in order to feel comfortable with Pelvic floor PT D/C and not fear exacerbation of current condition or symptoms returning. Status at eval : patient Asadronnychaz Carl of what activities to avoid to avoid exacerbation of current condition  3/18/22: PROGRESSING, patient is being compliant with HEPs  4/13/2022 Progressing . Patient reports to being compliant with the HEP.   PROGRESSING          PLAN  [x] Upgrade activities as tolerated     [x]  Continue plan of care  []  Update interventions per flow sheet       []  Discharge due to:_  []  Other:_      Breanna Perez, PT 4/13/2022  4:19 PM    Future Appointments   Date Time Provider Wilton Agarwal   4/20/2022  3:30 PM [facility]   4/27/2022  4:15 PM Sherron Meza, [address] [facility] [address]

## 2022-04-20 ENCOUNTER — HOSPITAL ENCOUNTER (OUTPATIENT)
Dept: PHYSICAL THERAPY | Age: 27
Discharge: HOME | End: 2022-04-20
Payer: OTHER GOVERNMENT

## 2022-04-20 PROCEDURE — 97110 THERAPEUTIC EXERCISES: CPT

## 2022-04-20 PROCEDURE — 97140 MANUAL THERAPY 1/> REGIONS: CPT

## 2022-04-20 PROCEDURE — 97530 THERAPEUTIC ACTIVITIES: CPT

## 2022-04-20 NOTE — PROGRESS NOTES
[facility] PF DAILY TREATMENT NOTE    Patient Name: Mark Anthony Borges  Date:2022  : 1995  [x]  Patient  Verified  Payor:  / Plan: [facility] / Product Type:  /    In time: 340 Out time: 425  Total Treatment Time (min): 45    For MC/BCBS only  Total Timed Codes (min): 45  Of Timed Code minutes, 1:1 Treatment Time: 39     Visit #: 9 of 12    Treatment Area: Stress incontinence (female) (male) [N39.3]    SUBJECTIVE  Pain Level (0-10 scale): 0/10  Any medication changes, allergies to medications, adverse drug reactions, diagnosis change, or new procedure performed?: [x] No    [] Yes (see summary sheet for update)  Subjective functional status/changes:   [x] No changes reported  Patient reports that she feels confident to discharge.       OBJECTIVE      27 min Therapeutic Exercise:  [x] See flow sheet :   [x]  Pelvic floor strengthening                 []  Pelvic floor downtraining  [x]  Quality pelvic floor contractions       []  Relaxation techniques  []  Urge suppression exercises  []  Other:  Rationale: increase strength and improve coordination  to improve the patients ability to maintain continence       10 min Therapeutic Activity:  [x]  See flow sheet : lifts and carries    []  Increase Tissue extensibility        []  Assess fiber intake    []  Assess voiding habits  []  Assess bowel habits  []  Other:   Rationale: increase strength, improve coordination and improve balance  to improve the patients ability to perform       8 min Manual:  []  See flow sheet : SIJ left, muscle energy, trigger point relase     Rationale: to decrease pain  to improve the patients ability to perform  tasks        With   [] TE   [] TA   [] neuro  [] manual  [] self care   [] other: Patient Education: [x] Review HEP    [] Progressed/Changed HEP based on:   [] positioning   [] body mechanics   [] transfers   [] heat/ice application    [] other:        Pain Level (0-10 scale) post treatment: 2/10    ASSESSMENT/Changes in Function: Patient is a 31year old female who has attended nine pelvic floor physical therapy appointments for urinary incontinence. Patient is in her third trimester of pregnancy. Patient reports a drop or two of leakage with a \"surprise\" sneeze. Patient reports being confident in continuing to progress at home. Patient will be discharged from physical therapy. [x]  Decrease # of leaks   [x] No change []  Improving [] Resolved            []  See Plan of Care  []  See progress note/recertification  [x]  See Discharge Summary         Progress towards goals / Updated goals:  Short term goals: To be achieved in 4 weeks:  Patient will demonstrate proper dietary/fluid habits and urge suppression strategies that promote bladder and bowel health to aid in management of urinary urgency and incontinence. Status at eval: Patient consuming sufficient water but some irritants and unaware of bladder fitness, dietary irritants and urge suppression strategies.    3/4/22:  MET, patient reports sufficient water intake      Patient will report improvement in UI complaints evidence by a decrease in pad usage and/or amount of leakage by 50% to improve QOL. Status at eval: Patient using 2-3 pads (incontinence) per day, generally drop or two but occasionally wet  (amount of leakage).    3/18/22: PROGRESSING, patient reports 1-2 pantiliners per day  3/25/22 Progressing Patient reports no major leakage this week and is no longer using pads   3/30/2022 Patient reports being about 80% better for UI complaints.  She reported one episode of UI after sneezing yesterday  4/8/22: nearly met, patient reports minimal leakage   4/13/22 UI with sneezing only. Nearly met  4/20/22 UI with sneezing only. Nearly met  D/C  GOAL     Patient will be able to maintain pelvic floor contraction for 5 seconds, 5 repetitions with no accessory substitution or breath holding.   Status at eval:Deferred to next appointment  Current:  External (at ischiorectal fossa) 10 seconds for 5 contractions with gluteal substitution  2/25/2022  Progressing   3/18/22: not assessed at this PN  4/13/22 Patient deferred D/C goal     s: To be achieved in 5 weeks:  Patient will report bladder continence 75% of the time with cough/sneeze/laugh and walking to the toilet.   Status at eval: patient stress and urgency incontinence 3 times  per day  3/18/22: MET, patient reports 75-80% continence     Patient will demonstrate the ability to maintain a pelvic floor contraction  for 10 seconds, 10 repetitions. Tested externally at the levator ani  Status at eval: deferred to the next appointment  Current:  External (at ischiorectal fossa) 10 seconds for 5 contractions with gluteal substitution  2/25/2022  Progressing   3/18/22: not assessed at this PN  4/13/22 Patient deferred D/C goal     Patient will demonstrate improvement of current complaints evidenced by a 13 point  improvement in FOTO, Pelvic functional disability index score  Status at [facility]: Pelvic functional disability index 51  3/18/22: will assess at next PN, today is patient's fourth visit  4/13/2022  68 GOAL MET D/C GOAL     Patient will demonstrate independence with management tools and exercise program that are beneficial for current condition in order to feel comfortable with Pelvic floor PT D/C and not fear exacerbation of current condition or symptoms returning. Status at eval : patient Gina Sera of what activities to avoid to avoid exacerbation of current condition  3/18/22: PROGRESSING, patient is being compliant with HEPs  4/13/2022 . Patient reports to being compliant with the HEP. [address]  4/20/2022 Patient reports being compliant with HEP.   She was instructed on perineal massage to begin about 4 weeks from her due date.         PLAN  []  Upgrade activities as tolerated     []  Continue plan of care  []  Update interventions per flow sheet       [x]  Discharge due to:_Goals met or progressing towards all goals  []  Other:_      Ariel Pedro, PT 4/20/2022  3:46 PM    Future Appointments   Date Time Provider Wilton Agarwal   4/27/2022  4:15 PM Ricki Ramos, [address] [facility]

## 2022-04-27 ENCOUNTER — APPOINTMENT (OUTPATIENT)
Dept: PHYSICAL THERAPY | Age: 27
End: 2022-04-27
Payer: OTHER GOVERNMENT

## 2022-05-24 NOTE — PROGRESS NOTES
[facility]  [address] [facility], [address] [facility] [place]  Ph [phone]  Fx [phone]    Physical Therapy Discharge Summary    Patient name: Milly Santos Start of Care: 2/16/2022   Referral source: Juan Jose Tatum FNP-C [facility]: 83/[id number]/[id number]                Medical Diagnosis: Stress incontinence (female) (male) [N39.3]    Onset Date:approximately 4 months ago                Treatment Diagnosis: Stress incontinence                                            ICD-10: N39.3   Prior Hospitalization: see medical history Provider#: [id number]   Medications: Verified on Patient summary List    Comorbidities: 3 pregnancies/ 2 vaginal (dtr 4 and son 18 months) and episiotomy, currently 20 weeks into 3rd pregnancies, wisdom teeth removal,    Prior Level of Function: Active lifestyle no urinary issues        Visits from Start of Care: 9    Missed Visits: 0    Reporting Period : 2/16/2022 to 4/20/2022    Goals/Measure of Progress:  Short term goals: To be achieved in 4 weeks:  Patient will demonstrate proper dietary/fluid habits and urge suppression strategies that promote bladder and bowel health to aid in management of urinary urgency and incontinence. Status at eval: Patient consuming sufficient water but some irritants and unaware of bladder fitness, dietary irritants and urge suppression strategies.    3/4/22:  MET, patient reports sufficient water intake      Patient will report improvement in UI complaints evidence by a decrease in pad usage and/or amount of leakage by 50% to improve QOL. Status at eval: Patient using 2-3 pads (incontinence) per day, generally drop or two but occasionally wet  (amount of leakage).    3/18/22: PROGRESSING, patient reports 1-2 pantiliners per day  3/25/22 Progressing Patient reports no major leakage this week and is no longer using pads   3/30/2022 Patient reports being about 80% better for UI complaints.  She reported one episode of UI after sneezing yesterday  4/8/22: nearly met, patient reports minimal leakage   4/13/22 UI with sneezing only. Nearly met  4/20/22 UI with sneezing only. Nearly met  D/C  GOAL     Patient will be able to maintain pelvic floor contraction for 5 seconds, 5 repetitions with no accessory substitution or breath holding. Status at eval:Deferred to next appointment  Current:  External (at ischiorectal fossa) 10 seconds for 5 contractions with gluteal substitution  2/25/2022  Progressing   3/18/22: not assessed at this PN  4/13/22 Patient deferred D/C goal     s: To be achieved in 5 weeks:  Patient will report bladder continence 75% of the time with cough/sneeze/laugh and walking to the toilet.   Status at eval: patient stress and urgency incontinence 3 times  per day  3/18/22: MET, patient reports 75-80% continence     Patient will demonstrate the ability to maintain a pelvic floor contraction  for 10 seconds, 10 repetitions. Tested externally at the levator ani  Status at eval: deferred to the next appointment  Current:  External (at ischiorectal fossa) 10 seconds for 5 contractions with gluteal substitution  2/25/2022  Progressing   3/18/22: not assessed at this PN  4/13/22 Patient deferred D/C goal     Patient will demonstrate improvement of current complaints evidenced by a 13 point  improvement in FOTO, Pelvic functional disability index score  Status at Eval: Pelvic functional disability index 51  3/18/22: will assess at next PN, today is patient's fourth visit  4/13/2022  68 GOAL MET D/C GOAL     Patient will demonstrate independence with management tools and exercise program that are beneficial for current condition in order to feel comfortable with Pelvic floor PT D/C and not fear exacerbation of current condition or symptoms returning. Status at eval : patient Diamond Hinders of what activities to avoid to avoid exacerbation of current condition  3/18/22: PROGRESSING, patient is being compliant with HEPs  4/13/2022 .  Patient reports to being compliant with the HEP. Veronika Levi  4/20/2022 Patient reports being compliant with HEP. She was instructed on perineal massage to begin about 4 weeks from her due date.         Assessment/ Summary of Care: Patient is a 31year old female who has attended nine pelvic floor physical therapy appointments for urinary incontinence. Patient is in her third trimester of pregnancy. Patient reports a drop or two of leakage with a \"surprise\" sneeze. Patient reports being confident in continuing to progress at home. Patient will be discharged from physical therapy.      [x]? Decrease # of leaks    [x]? No change []? Improving []?  Resolved           RECOMMENDATIONS:  [x]Discontinue therapy: [x]Patient has reached or is progressing toward set goals      []Patient is non-compliant or has abdicated      []Due to lack of appreciable progress towards set goals    Isaias Erazo, PT 5/24/2022 10:03 AM

## 2022-06-09 LAB — GRBS, EXTERNAL: POSITIVE

## 2022-06-24 ENCOUNTER — HOSPITAL ENCOUNTER (INPATIENT)
Age: 27
LOS: 2 days | Discharge: HOME | End: 2022-06-26
Attending: OBSTETRICS & GYNECOLOGY | Admitting: OBSTETRICS & GYNECOLOGY
Payer: OTHER GOVERNMENT

## 2022-06-24 PROBLEM — Z33.1 IUP (INTRAUTERINE PREGNANCY), INCIDENTAL: Status: ACTIVE | Noted: 2022-06-24

## 2022-06-24 PROBLEM — B95.1 GROUP BETA STREP POSITIVE: Status: ACTIVE | Noted: 2022-06-24

## 2022-06-24 PROBLEM — O26.899 RH NEGATIVE STATE IN ANTEPARTUM PERIOD: Status: ACTIVE | Noted: 2022-06-24

## 2022-06-24 PROBLEM — Z67.91 RH NEGATIVE STATE IN ANTEPARTUM PERIOD: Status: ACTIVE | Noted: 2022-06-24

## 2022-06-24 PROBLEM — Z37.9 NORMAL LABOR: Status: ACTIVE | Noted: 2022-06-24

## 2022-06-24 LAB
BASOPHILS # BLD: 0.1 K/UL (ref 0–0.1)
BASOPHILS NFR BLD: 1 % (ref 0–2)
DIFFERENTIAL METHOD BLD: ABNORMAL
EOSINOPHIL # BLD: 0 K/UL (ref 0–0.4)
EOSINOPHIL NFR BLD: 0 % (ref 0–5)
ERYTHROCYTE [DISTWIDTH] IN BLOOD BY AUTOMATED COUNT: 15.3 % (ref 11.6–14.5)
HCT VFR BLD AUTO: 33.3 % (ref 35–45)
HGB BLD-MCNC: 10 G/DL (ref 12–16)
IMM GRANULOCYTES # BLD AUTO: 0.1 K/UL (ref 0–0.04)
IMM GRANULOCYTES NFR BLD AUTO: 1 % (ref 0–0.5)
LYMPHOCYTES # BLD: 3.1 K/UL (ref 0.9–3.6)
LYMPHOCYTES NFR BLD: 23 % (ref 21–52)
MCH RBC QN AUTO: 23.3 PG (ref 24–34)
MCHC RBC AUTO-ENTMCNC: 30 G/DL (ref 31–37)
MCV RBC AUTO: 77.6 FL (ref 78–100)
MONOCYTES # BLD: 1.3 K/UL (ref 0.05–1.2)
MONOCYTES NFR BLD: 9 % (ref 3–10)
NEUTS SEG # BLD: 8.9 K/UL (ref 1.8–8)
NEUTS SEG NFR BLD: 66 % (ref 40–73)
NRBC # BLD: 0 K/UL (ref 0–0.01)
NRBC BLD-RTO: 0 PER 100 WBC
PLATELET # BLD AUTO: 212 K/UL (ref 135–420)
PMV BLD AUTO: 11.3 FL (ref 9.2–11.8)
RBC # BLD AUTO: 4.29 M/UL (ref 4.2–5.3)
WBC # BLD AUTO: 13.4 K/UL (ref 4.6–13.2)

## 2022-06-24 PROCEDURE — 86870 RBC ANTIBODY IDENTIFICATION: CPT

## 2022-06-24 PROCEDURE — 65270000029 HC RM PRIVATE

## 2022-06-24 PROCEDURE — 59025 FETAL NON-STRESS TEST: CPT

## 2022-06-24 PROCEDURE — 75410000000 HC DELIVERY VAGINAL/SINGLE

## 2022-06-24 PROCEDURE — 75410000003 HC RECOV DEL/VAG/CSECN EA 0.5 HR

## 2022-06-24 PROCEDURE — 99283 EMERGENCY DEPT VISIT LOW MDM: CPT

## 2022-06-24 PROCEDURE — 86900 BLOOD TYPING SEROLOGIC ABO: CPT

## 2022-06-24 PROCEDURE — 85025 COMPLETE CBC W/AUTO DIFF WBC: CPT

## 2022-06-24 PROCEDURE — 75410000002 HC LABOR FEE PER 1 HR

## 2022-06-24 PROCEDURE — 74011250636 HC RX REV CODE- 250/636: Performed by: ADVANCED PRACTICE MIDWIFE

## 2022-06-24 RX ORDER — AMOXICILLIN 250 MG
1 CAPSULE ORAL
Status: DISCONTINUED | OUTPATIENT
Start: 2022-06-24 | End: 2022-06-26 | Stop reason: HOSPADM

## 2022-06-24 RX ORDER — CHOLECALCIFEROL (VITAMIN D3) 50 MCG
10 CAPSULE ORAL DAILY
COMMUNITY

## 2022-06-24 RX ORDER — LIDOCAINE HYDROCHLORIDE 10 MG/ML
20 INJECTION, SOLUTION EPIDURAL; INFILTRATION; INTRACAUDAL; PERINEURAL AS NEEDED
Status: DISCONTINUED | OUTPATIENT
Start: 2022-06-24 | End: 2022-06-24 | Stop reason: HOSPADM

## 2022-06-24 RX ORDER — LANOLIN ALCOHOL/MO/W.PET/CERES
325 CREAM (GRAM) TOPICAL
COMMUNITY
End: 2022-06-26

## 2022-06-24 RX ORDER — CHOLECALCIFEROL (VITAMIN D3) 125 MCG
2000 CAPSULE ORAL
COMMUNITY

## 2022-06-24 RX ORDER — NALBUPHINE HYDROCHLORIDE 10 MG/ML
10 INJECTION, SOLUTION INTRAMUSCULAR; INTRAVENOUS; SUBCUTANEOUS
Status: DISCONTINUED | OUTPATIENT
Start: 2022-06-24 | End: 2022-06-24 | Stop reason: HOSPADM

## 2022-06-24 RX ORDER — SODIUM CHLORIDE, SODIUM LACTATE, POTASSIUM CHLORIDE, CALCIUM CHLORIDE 600; 310; 30; 20 MG/100ML; MG/100ML; MG/100ML; MG/100ML
125 INJECTION, SOLUTION INTRAVENOUS CONTINUOUS
Status: DISCONTINUED | OUTPATIENT
Start: 2022-06-24 | End: 2022-06-24 | Stop reason: HOSPADM

## 2022-06-24 RX ORDER — OXYTOCIN/RINGER'S LACTATE 30/500 ML
10 PLASTIC BAG, INJECTION (ML) INTRAVENOUS AS NEEDED
Status: COMPLETED | OUTPATIENT
Start: 2022-06-24 | End: 2022-06-24

## 2022-06-24 RX ORDER — BUTORPHANOL TARTRATE 2 MG/ML
2 INJECTION INTRAMUSCULAR; INTRAVENOUS
Status: DISCONTINUED | OUTPATIENT
Start: 2022-06-24 | End: 2022-06-24 | Stop reason: HOSPADM

## 2022-06-24 RX ORDER — ACETAMINOPHEN 325 MG/1
650 TABLET ORAL
Status: DISCONTINUED | OUTPATIENT
Start: 2022-06-24 | End: 2022-06-26 | Stop reason: HOSPADM

## 2022-06-24 RX ORDER — PROMETHAZINE HYDROCHLORIDE 25 MG/ML
25 INJECTION, SOLUTION INTRAMUSCULAR; INTRAVENOUS
Status: DISCONTINUED | OUTPATIENT
Start: 2022-06-24 | End: 2022-06-26 | Stop reason: HOSPADM

## 2022-06-24 RX ORDER — TERBUTALINE SULFATE 1 MG/ML
0.25 INJECTION SUBCUTANEOUS
Status: DISCONTINUED | OUTPATIENT
Start: 2022-06-24 | End: 2022-06-24 | Stop reason: HOSPADM

## 2022-06-24 RX ORDER — MINERAL OIL
30 OIL (ML) ORAL AS NEEDED
Status: DISCONTINUED | OUTPATIENT
Start: 2022-06-24 | End: 2022-06-24 | Stop reason: HOSPADM

## 2022-06-24 RX ORDER — IBUPROFEN 400 MG/1
800 TABLET ORAL
Status: DISCONTINUED | OUTPATIENT
Start: 2022-06-24 | End: 2022-06-26 | Stop reason: HOSPADM

## 2022-06-24 RX ORDER — VANCOMYCIN 2 GRAM/500 ML IN 0.9 % SODIUM CHLORIDE INTRAVENOUS
2000 ONCE
Status: COMPLETED | OUTPATIENT
Start: 2022-06-24 | End: 2022-06-24

## 2022-06-24 RX ORDER — OXYTOCIN/0.9 % SODIUM CHLORIDE 30/500 ML
87.3 PLASTIC BAG, INJECTION (ML) INTRAVENOUS AS NEEDED
Status: DISCONTINUED | OUTPATIENT
Start: 2022-06-24 | End: 2022-06-24 | Stop reason: HOSPADM

## 2022-06-24 RX ORDER — HYDROMORPHONE HYDROCHLORIDE 1 MG/ML
1 INJECTION, SOLUTION INTRAMUSCULAR; INTRAVENOUS; SUBCUTANEOUS
Status: DISCONTINUED | OUTPATIENT
Start: 2022-06-24 | End: 2022-06-24 | Stop reason: HOSPADM

## 2022-06-24 RX ORDER — LANOLIN ALCOHOL/MO/W.PET/CERES
3 CREAM (GRAM) TOPICAL
Status: DISCONTINUED | OUTPATIENT
Start: 2022-06-24 | End: 2022-06-26 | Stop reason: HOSPADM

## 2022-06-24 RX ORDER — METHYLERGONOVINE MALEATE 0.2 MG/ML
0.2 INJECTION INTRAVENOUS AS NEEDED
Status: DISCONTINUED | OUTPATIENT
Start: 2022-06-24 | End: 2022-06-24 | Stop reason: HOSPADM

## 2022-06-24 RX ORDER — ZINC GLUCONATE 10 MG
250 LOZENGE ORAL DAILY
COMMUNITY

## 2022-06-24 RX ADMIN — SODIUM CHLORIDE, POTASSIUM CHLORIDE, SODIUM LACTATE AND CALCIUM CHLORIDE 125 ML/HR: 600; 310; 30; 20 INJECTION, SOLUTION INTRAVENOUS at 03:45

## 2022-06-24 RX ADMIN — Medication 10000 MILLI-UNITS: at 08:53

## 2022-06-24 RX ADMIN — VANCOMYCIN HYDROCHLORIDE 2000 MG: 10 INJECTION, POWDER, LYOPHILIZED, FOR SOLUTION INTRAVENOUS at 03:45

## 2022-06-24 NOTE — PROGRESS NOTES
@ 38+1 weeks gestation arrived with c/o painful ctx rating 4/10 since 2200 this evening. Pt reports +FM and denies any leaking of fluid or vaginal bleeding. Pt  EFM/TOCO applied and vital signs obtained. 5- ISABELLA Notingham called and notified of Brianne 74- EFM/TOCO d/c'd and pt given a labor ball and extra gown to ambulate. Discussed plan to recheck cervix in 2 hours. 4771- EFM/TOCO d/c'd by DEEJAY Hudson- Bedside and Verbal shift change report given to MAX Killian RN (oncoming nurse) by KODAK Cary (offgoing nurse). Report included the following information SBAR, Kardex, Procedure Summary, Intake/Output, MAR, Accordion, Recent Results and Med Rec Status. All pt care relinquished.

## 2022-06-24 NOTE — LACTATION NOTE
This note was copied from a baby's chart. 0 Mom educated on breastfeeding basics--hunger cues, feeding on demand, waking baby if baby sleeps too long between feeds, importance of skin to skin, positioning and latching, risk of pacifier use and supplemental feedings, and importance of rooming in--and use of log sheet. Mom also educated on benefits of breastfeeding for herself and baby. Mom verbalized understanding. No questions at this time. Per mom, infant latched and nursing well. Mom stated \"its been a little painful. The left nipple was slanted at the end of the last feeding\". Encouraged to call for the next feeding. Normal DOL behaviors were discussed.

## 2022-06-24 NOTE — L&D DELIVERY NOTE
Delivery Note    Obstetrician:  Marta Christiansen CNM    Assistant: none    Pre-Delivery Diagnosis: Term pregnancy, Spontaneous labor and Single fetus    Post-Delivery Diagnosis: Living  infant(s)    Intrapartum Event: None    Procedure: Spontaneous vaginal delivery    Epidural: NO    Monitor:  Fetal Heart Tones - External and Uterine Contractions - External    Indications for instrumental delivery: none    Estimated Blood Loss: 100    Episiotomy: n/a    Laceration(s):  none    Laceration(s) repair: NO    Presentation: Cephalic    Fetal Description: poe    Fetal Position: Right Occiput Anterior    Birth Weight: not yet assessed    Birth Length: not yet assessed    Apgar - One Minute: 9    Apgar - Five Minutes: 9    Umbilical Cord: 3 vessels present and Cord blood sent to lab for type, Rh, and Ronny' test  Specimens: placenta, intact  Complications:  none           Cord Blood Results:   Information for the patient's :  Jd Gonzales [[id number]]   No results found for: PCTABR, PCTDIG, BILI, ABORH     Prenatal Labs:     Lab Results   Component Value Date/Time    ABO/Rh(D) A NEGATIVE 2022 07:41 AM        Attending Attestation: I was present and scrubbed for the entire procedure. Janice Kumar CNM  22       Patient feeling urge to push and was found to be AL/100/0. Patient started pushing adequately with coaching, anterior lip was reduced after first push. Fetal head crowned and delivered shortly after in OA position which then restituted to CATALINA. No cord noted and anterior shoulder was delivered without difficulty, baby was born at [address] and placed on maternal abdomen for skin to skin. Cord was clamped after 5 minutes and cut by FOB. Cord blood was collected. Placenta was delivered intact in Jacinto saima position at 7233. Pitocin was started. Fundus firm at U-0. Perineum was inspected and no laceration was noted.  mL. Counts correct. Mom and baby stable and bonding. Edwige Pringle CNM  06/24/22

## 2022-06-24 NOTE — PROGRESS NOTES
0715 Bedside and Verbal shift change report given to MAX Vickers RN (oncoming nurse) by KODAK Suarez (offgoing nurse). Report included the following information SBAR, Kardex, Intake/Output, MAR, Recent Results and Quality Measures. 0730 patient reports feeling rectal pressure with contractions. Brooklyn Jimenez CNM bedside, plan for SVE and AROM soon. 0806 AROM clear, scant fluid. SVE 9/100/+1    0819 SVE complete    0820 start pushing. RN remains at the bedside, continuously assessing fetal heart rate baseline, variability, periodic and episodic changes, and uterine activity, providing labor support, and continuously monitoring maternal status and vital signs. 0845  of viable male infant at 37 weeks and 1 day. Delayed cord clamping and infant skin to skin with tactile stimulation. Infant vigorous. 3413 postpartum pitocin running per protocol    0853 placenta delivered, intact    2654 provider exits    0950 pt up to void with RN assist. Steady gait. Pt performs efrain care, new pad, ice pack, underwear, gown provided. 1010 Bedside shift change report given to TOMEKA Reaves RN (oncoming nurse) by Lalitha Cote. Omkar Vickers RN (offgoing nurse). Report included the following information SBAR, Kardex, Intake/Output, MAR, Recent Results and Quality Measures. Patient transferred to postpartum room 244. Pt care relinquished at this time.

## 2022-06-24 NOTE — H&P
History & Physical    Name: Chiara Quezada MRN: [id number]  SSN: [id number]    YOB: 1995  Age: 31 y.o. Sex: female        Subjective:     Estimated Date of Delivery: 22  OB History    Para Term  AB Living   4 2 2   1 2   SAB IAB Ectopic Molar Multiple Live Births   1         2      # Outcome Date GA Lbr Nain/2nd Weight Sex Delivery Anes PTL Lv   4 Current            3 Term 20 37w1d / 00:18 2.79 kg M Vag-Spont EPI N JENNY   2 SAB 18 6w0d          1 Term 17 40w2d  3.118 kg F Vag-Spont EPI  JENNY       Ms. Lee Colon is admitted with pregnancy at 38w1d for active labor. Prenatal course was complicated by GBS positive, hx of precipitous birth, RH negative and anemia . Please see prenatal records for details. No past medical history on file. No past surgical history on file. Social History     Occupational History    Not on file   Tobacco Use    Smoking status: Not on file    Smokeless tobacco: Not on file   Substance and Sexual Activity    Alcohol use: Not on file    Drug use: Not on file    Sexual activity: Not on file     No family history on file. Allergies   Allergen Reactions    Penicillin G Rash    Shellfish Derived Nausea and Vomiting     Prior to Admission medications    Medication Sig Start Date End Date Taking? Authorizing Provider   PNV Comb #2-Iron-Omega 3-FA 02-4-299-200 mg cmpk Take 1 Tablet by mouth daily. Indications: pregnancy   Yes Provider, Historical   ferrous sulfate (Iron) 325 mg (65 mg iron) tablet Take 325 mg by mouth Daily (before breakfast). Yes Provider, Historical   cholecalciferol, vitamin D3, (Vitamin D3) 50 mcg (2,000 unit) tab Take 2,000 Units by mouth. Yes Provider, Historical   B.animalis,bifid,infantis,long (Probiotic 4X) 10-15 mg TbEC Take 10 mg/day by mouth daily. Yes Provider, Historical   magnesium 250 mg tab Take 250 mg by mouth daily.    Yes Provider, Historical        Review of Systems: A comprehensive review of systems was negative. Objective:     Vitals:  Vitals:    22 0035   Weight: 56.2 kg (124 lb)   Height: 5' 3.5\" (1.613 m)        Physical Exam:  Patient without distress.   Lung: normal respiratory effort  Abdomen: soft, nontender  Fundus: soft and non tender  Perineum: blood absent, amniotic fluid absent  Cervical Exam: 4/100/0  Membranes:  Intact  Fetal Heart Rate: Reactive    Prenatal Labs:   No results found for: ABORH, RUBELLAEXT, GRBSEXT, HBSAGEXT, HIVEXT, RPREXT, GONNOEXT, CHLAMEXT, ABORHEXT, RUBELLAEXT, GRBSEXT, HBSAGEXT, HIVEXT, RPREXT, GONNOEXT, CHLAMEXT, ABORHEXT, RUBELLAEXT, GRBSEXT, HBSAGEXT, HIVEXT, RPREXT, GONNOEXT, CHLAMEXT      Assessment/Plan:     Active Problems:    IUP (intrauterine pregnancy), incidental (2022)      Group beta Strep positive (2022)      Rh negative state in antepartum period (2022)         Plan:     Admit to L&D  Place IV/Routine labs  Continuous EFM  Reassuring Fetal status  Expectant management  Epidural prn  Anticipate   Dr. Darryle Deeds aware of patient status and admission        Vesna Watkins CNM

## 2022-06-24 NOTE — PROGRESS NOTES
1010-TRANSFER - IN REPORT:    Verbal report received from Leobardo(name) on Chiara Lockhartmer  being received from L/D(unit) for routine progression of care      Report consisted of patients Situation, Background, Assessment and   Recommendations(SBAR). Information from the following report(s) SBAR, Intake/Output, MAR and Recent Results was reviewed with the receiving nurse. Opportunity for questions and clarification was provided. Assessment completed upon patients arrival to unit and care assumed. 1030- pt ambulated to restroom and voided 100ml. Pads changed and pt performed efrain care and ambulated back to bed    1050- fundus reassessed- f-1 small bleeding    1244- pt educated on infant being taken to be placed under warmer    1348- pt ambulated to restroom and voided 300ml of urine. Hand expression education completed with mom and handout with spoon given for reinforcement. Showed how to feed infant expressed colostrum with spoon. Mom verbalized understanding and no questions at this time. 1514- reassessment complete    1745- pt resting in chair    1930- Bedside and Verbal shift change report given to [facility] (oncoming nurse) by Cecy Sanford (offgoing nurse). Report included the following information SBAR, Intake/Output, MAR and Recent Results.

## 2022-06-25 LAB
ABO + RH BLD: NORMAL
BLOOD BANK CMNT PATIENT-IMP: NORMAL
BLOOD GROUP ANTIBODIES SERPL: NORMAL
BLOOD GROUP ANTIBODIES SERPL: NORMAL
HCT VFR BLD AUTO: 30.3 % (ref 35–45)
HGB BLD-MCNC: 9 G/DL (ref 12–16)
SPECIMEN EXP DATE BLD: NORMAL

## 2022-06-25 PROCEDURE — 74011250637 HC RX REV CODE- 250/637

## 2022-06-25 PROCEDURE — 85018 HEMOGLOBIN: CPT

## 2022-06-25 PROCEDURE — 74011250636 HC RX REV CODE- 250/636: Performed by: MIDWIFE

## 2022-06-25 PROCEDURE — 36415 COLL VENOUS BLD VENIPUNCTURE: CPT

## 2022-06-25 PROCEDURE — 65270000029 HC RM PRIVATE

## 2022-06-25 PROCEDURE — 85461 HEMOGLOBIN FETAL: CPT

## 2022-06-25 PROCEDURE — 86900 BLOOD TYPING SEROLOGIC ABO: CPT

## 2022-06-25 RX ORDER — LANOLIN ALCOHOL/MO/W.PET/CERES
1 CREAM (GRAM) TOPICAL 2 TIMES DAILY WITH MEALS
Status: DISCONTINUED | OUTPATIENT
Start: 2022-06-25 | End: 2022-06-26 | Stop reason: HOSPADM

## 2022-06-25 RX ORDER — LANOLIN ALCOHOL/MO/W.PET/CERES
325 CREAM (GRAM) TOPICAL 2 TIMES DAILY WITH MEALS
Qty: 60 TABLET | Refills: 2 | Status: SHIPPED | OUTPATIENT
Start: 2022-06-25

## 2022-06-25 RX ORDER — IBUPROFEN 800 MG/1
800 TABLET ORAL
Qty: 90 TABLET | Refills: 0 | Status: SHIPPED | OUTPATIENT
Start: 2022-06-25

## 2022-06-25 RX ADMIN — FERROUS SULFATE TAB 325 MG (65 MG ELEMENTAL FE) 325 MG: 325 (65 FE) TAB at 11:39

## 2022-06-25 RX ADMIN — FERROUS SULFATE TAB 325 MG (65 MG ELEMENTAL FE) 325 MG: 325 (65 FE) TAB at 16:59

## 2022-06-25 RX ADMIN — HUMAN RHO(D) IMMUNE GLOBULIN 0.3 MG: 300 INJECTION, SOLUTION INTRAMUSCULAR at 10:15

## 2022-06-25 NOTE — PROGRESS NOTES
Assumed care of pt.  0805-asleep. NAD. 1010-VSS. Assessment completed. Denies needs. 1015-Rhogam given. 1025-assisted with breast feeding. 1105-INT d/c'd.  1139-scheduled meds given. 1300-ambulating in hallway. Denies needs. 1430-sitting in chair. Denies needs. 1547-breast feeding. 1659-on phone. To call when ready for assessment   1720-reassessment completed. Denies needs. 1920-Bedside and Verbal shift change report given to Willis Cruz RN  (oncoming nurse) by CRISTOBAL Mak LPN (offgoing nurse). Report given with SBAR, Kardex, Intake/Output, MAR and Recent Results.

## 2022-06-25 NOTE — PROGRESS NOTES
Progress Note    Patient: Nadja Navarro MRN: [id number]  SSN: [id number]    YOB: 1995  Age: 31 y.o. Sex: female      Subjective:     Postpartum Day: 1 Vaginal Delivery    The patient is without complaints. The patient is ambulating well. The patient is tolerating a normal diet. The baby is well. Objective:      Patient Vitals for the past 8 hrs:   BP Temp Pulse Resp SpO2   06/25/22 1010 105/72 99 °F (37.2 °C) 87 16 100 %     LABS: Recent Results (from the past 24 hour(s))   HGB & HCT    Collection Time: 06/25/22  5:08 AM   Result Value Ref Range    HGB 9.0 (L) 12.0 - 16.0 g/dL    HCT 30.3 (L) 35.0 - 45.0 %   RH IMMUNE GLOBULIN EVAL-LAB ORDER    Collection Time: 06/25/22  5:08 AM   Result Value Ref Range    ABO/Rh(D) A NEGATIVE     Fetal screen NEG     Cord Blood Type O  POS       CALLED TO: Jeffrey Curtis RN 2N ON 6/25/2022 0754 BY [facility]     Unit number [id number]     Blood component type RH IMMUNE GLOBULIN     Unit division 00     Status of unit ISSUED        Lab Results   Component Value Date/Time    HGB 9.0 (L) 06/25/2022 05:08 AM     Lab Results   Component Value Date/Time    HCT 30.3 (L) 06/25/2022 05:08 AM      Lochia:  appropriate   Uterine Fundus:   firm, -1     Lab/Data Review: All lab results for the last 24 hours reviewed. Assessment:     Status post: Doing well postpartum vaginal delivery day 1. Plan:     Continue day 1 post-vaginal delivery orders. Postpartum care discussed including diet, ambulation, and actvitiy restrictions. Would like to go home today but depends on baby d/t vancomycin treatment for GBS - pediatrician will decide if dc home. If no dc today then ok to go home tomorrow after 48 hours. Reviewed DC instructions.      Signed By: Evelyn Bergman CNM     June 25, 2022

## 2022-06-25 NOTE — PROGRESS NOTES
1930 Bedside shift change report given to Suri Shields, RN (oncoming nurse) by Fang Pineda RN (offgoing nurse). Report included the following information SBAR, Intake/Output, MAR and Recent Results. 2210 Pt. joined at bedside by significant other and baby. AAOx4. Pain 0/10. Fundus firm at U -2, small rubra lochia. No clots noted. Educated on signs and symptoms to report and plan of care. No further questions or concerns at this time. Callbell within reach. Bed in lowest position. Shift summary: Pt rested with no new clinical concerns noted. 0740 Bedside shift change report given to DESTINEE Mak LPN (oncoming nurse) by Suri Shields, [place] (offgoing nurse). Report included the following information SBAR, Intake/Output, MAR and Recent Results.

## 2022-06-25 NOTE — DISCHARGE SUMMARY
Obstetrical Discharge Summary     Name: Mariel Liang MRN: [id number]  SSN: [id number]    YOB: 1995  Age: 31 y.o. Sex: female      Admit Date: 2022    Discharge Date: 2022     Admitting Physician: Reuben Muñiz MD     Attending Physician:  Teofilo Navarro MD     Admission Diagnoses: Normal labor [O80, Z37.9]    Discharge Diagnoses:   Information for the patient's :  [address] [[id number]]   Delivery of a 2.995 kg male infant via Vaginal, Spontaneous on 2022 at 8:45 AM  by Deejay Schwarz. Apgars were 9  and 9 . Additional Diagnoses:   Hospital Problems  Date Reviewed: 2022          Codes Class Noted POA     (spontaneous vaginal delivery) ICD-10-CM: O80  ICD-9-CM: 650  2022 Unknown        IUP (intrauterine pregnancy), incidental ICD-10-CM: Z33.1  ICD-9-CM: V22.2  2022 Yes        Group beta Strep positive ICD-10-CM: B95.1  ICD-9-CM: 041.02  2022 Yes        Rh negative state in antepartum period ICD-10-CM: O26.899, Z67.91  ICD-9-CM: 646.83  2022 Yes        Normal labor ICD-10-CM: O80, Z37.9  ICD-9-CM: 350  2022 Unknown             Lab Results   Component Value Date/Time    GrBStrep, External positive 2022 12:00 AM       Immunization(s):   Immunization History   Administered Date(s) Administered    Rho(D) Immune Globulin - IM 2022        Hospital Course: Normal hospital course following the delivery. Patient Instructions:   Current Discharge Medication List      START taking these medications    Details   ibuprofen (MOTRIN) 800 mg tablet Take 1 Tablet by mouth every eight (8) hours as needed (Pain scale 4-6). Qty: 90 Tablet, Refills: 0         CONTINUE these medications which have CHANGED    Details   ferrous sulfate 325 mg (65 mg iron) tablet Take 1 Tablet by mouth two (2) times daily (with meals).   Qty: 60 Tablet, Refills: 2         CONTINUE these medications which have NOT CHANGED    Details   PNV Comb #2-Iron-Omega 3-FA 53-3-229-200 mg cmpk Take 1 Tablet by mouth daily. Indications: pregnancy      cholecalciferol, vitamin D3, (Vitamin D3) 50 mcg (2,000 unit) tab Take 2,000 Units by mouth. B.animalis,bifid,infantis,long (Probiotic 4X) 10-15 mg TbEC Take 10 mg/day by mouth daily. magnesium 250 mg tab Take 250 mg by mouth daily. Reference my discharge instructions.     Follow-up Appointments   Procedures    FOLLOW UP VISIT Appointment in: 6 Weeks Please call OB office to schedule 6 week postpartum appointment     Please call OB office to schedule 6 week postpartum appointment     Standing Status:   Standing     Number of Occurrences:   1     Order Specific Question:   Appointment in     Answer:   6 Weeks        Signed By:  Rut Gross CNM     June 25, 2022

## 2022-06-25 NOTE — PROGRESS NOTES
Problem: Patient Education: Go to Patient Education Activity  Goal: Patient/Family Education  Outcome: Progressing Towards Goal     Problem: Pain  Goal: *Control of Pain  Outcome: Progressing Towards Goal     Problem: Patient Education: Go to Patient Education Activity  Goal: Patient/Family Education  Outcome: Progressing Towards Goal     Problem: Vaginal Delivery: Postpartum Day 1  Goal: Activity/Safety  Outcome: Progressing Towards Goal  Goal: Consults, if ordered  Outcome: Progressing Towards Goal  Goal: Diagnostic Test/Procedures  Outcome: Progressing Towards Goal  Goal: Nutrition/Diet  Outcome: Progressing Towards Goal  Goal: Discharge Planning  Outcome: Progressing Towards Goal  Goal: Medications  Outcome: Progressing Towards Goal  Goal: Treatments/Interventions/Procedures  Outcome: Progressing Towards Goal  Goal: Psychosocial  Outcome: Progressing Towards Goal  Goal: *Vital signs within defined limits  Outcome: Progressing Towards Goal  Goal: *Labs within defined limits  Outcome: Progressing Towards Goal  Goal: *Hemodynamically stable  Outcome: Progressing Towards Goal  Goal: *Optimal pain control at patient's stated goal  Outcome: Progressing Towards Goal  Goal: *Participates in infant care  Outcome: Progressing Towards Goal  Goal: *Demonstrates progressive activity  Outcome: Progressing Towards Goal  Goal: *Performs self perineal care  Outcome: Progressing Towards Goal  Goal: *Appropriate parent-infant bonding  Outcome: Progressing Towards Goal  Goal: *Tolerating diet  Outcome: Progressing Towards Goal  Goal: *Performs self breast care  Outcome: Progressing Towards Goal

## 2022-06-26 VITALS
BODY MASS INDEX: 21.17 KG/M2 | SYSTOLIC BLOOD PRESSURE: 113 MMHG | HEART RATE: 88 BPM | OXYGEN SATURATION: 100 % | TEMPERATURE: 98 F | WEIGHT: 124 LBS | DIASTOLIC BLOOD PRESSURE: 58 MMHG | RESPIRATION RATE: 18 BRPM | HEIGHT: 64 IN

## 2022-06-26 LAB
ABO + RH BLD: NORMAL
ABO + RH BLDCO: NORMAL
BLD PROD TYP BPU: NORMAL
BPU ID: NORMAL
CALLED TO:,BCALL1: NORMAL
FETAL SCREEN,FMHS: NORMAL
STATUS OF UNIT,%ST: NORMAL
UNIT DIVISION, %UDIV: 0

## 2022-06-26 PROCEDURE — 74011250637 HC RX REV CODE- 250/637

## 2022-06-26 RX ADMIN — FERROUS SULFATE TAB 325 MG (65 MG ELEMENTAL FE) 325 MG: 325 (65 FE) TAB at 09:28

## 2022-06-26 NOTE — DISCHARGE INSTRUCTIONS
POST DELIVERY DISCHARGE INSTRUCTIONS    Name: Valentin Mayes  YOB: 1995  Primary Diagnosis: Active Problems:    IUP (intrauterine pregnancy), incidental (2022)      Group beta Strep positive (2022)      Rh negative state in antepartum period (2022)      Normal labor (2022)       (spontaneous vaginal delivery) (2022)        General:     Diet/Diet Restrictions:  Eight 8-ounce glasses of fluid daily (water, juices); avoid excessive caffeine intake. Meals/snacks as desired which are high in fiber and carbohydrates and low in fat and cholesterol. Physical Activity / Restrictions / Safety:     Avoid heavy lifting, no more that 8 lbs. For 2-3 weeks; limit use of stairs to 2 times daily for the first week home. No driving for one week. Avoid intercourse 4-6 weeks, no douching or tampon use. Check with obstetrician before starting or resuming an exercise program.         Discharge Instructions/Special Treatment/Home Care Needs:     Continue prenatal vitamins. Continue to use squirt bottle with warm water on your episiotomy after each bathroom use until bleeding stops. Call your doctor for the following:     Fever over 100.4 degrees by mouth. Vaginal bleeding heavier than a normal menstrual period or clot larger than a golf ball. Red streaks or increased swelling of legs, painful red streaks on your breast.  Painful urination, constipation and increased pain or swelling or discharge with your incision. If you feel extremely anxious or overwhelmed. If you have thoughts of harming yourself and/or your baby. Pain Management:     Pain Management:   Take Acetaminophen (Tylenol) or Ibuprofen (Advil, Motrin), as directed for pain. Use a warm Sitz bath 3 times daily to relieve episiotomy or hemorrhoidal discomfort. Heating pad to  incision as needed. For hemorrhoidal discomfort, use Tucks and Anusol cream as needed and directed. Follow-Up Care:      These are general instructions for a healthy lifestyle:    No smoking/ No tobacco products/ Avoid exposure to second hand smoke    Surgeon General's Warning:  Quitting smoking now greatly reduces serious risk to your health. Obesity, smoking, and sedentary lifestyle greatly increases your risk for illness    A healthy diet, regular physical exercise & weight monitoring are important for maintaining a healthy lifestyle    Recognize signs and symptoms of STROKE:    F-face looks uneven    A-arms unable to move or move unevenly    S-speech slurred or non-existent    T-time-call 911 as soon as signs and symptoms begin-DO NOT go       Back to bed or wait to see if you get better-TIME IS BRAIN. Patient armband removed and given to patient to take home.   Patient was informed of the privacy risks if armband lost or stolen

## 2022-06-26 NOTE — PROGRESS NOTES
Assumed care of pt.  0925-assessment completed. Discharge instructions reviewed with pt.  1015-breast feeding. 1034-discharged home with infant.

## 2022-06-26 NOTE — PROGRESS NOTES
Problem: Pain  Goal: *Control of Pain  Outcome: Progressing Towards Goal     Problem: Vaginal Delivery: Postpartum 2  Goal: Activity/Safety  Outcome: Progressing Towards Goal  Goal: Consults, if ordered  Outcome: Progressing Towards Goal  Goal: Discharge Planning  Outcome: Progressing Towards Goal  Goal: Medications  Outcome: Progressing Towards Goal  Goal: Treatments/Interventions/Procedures  Outcome: Progressing Towards Goal

## 2022-06-26 NOTE — PROGRESS NOTES
1920 Bedside shift change report given to Iliana Zavala RN (oncoming nurse) by Margarita Foreman LPN (offgoing nurse). Report included the following information SBAR, Intake/Output, MAR and Recent Results. 2200 Pt holding baby, requests assessment be done later as baby just fell asleep. No needs at this time. 2300 Updated pt and fob on plan of care for infant r/t increased respirations. All questions answered. 2305 Pt. joined at bedside by significant other and baby. AAOx4. Pain 0/10. Fundus firm at U -2, scant rubra lochia. No clots noted. Educated on signs and symptoms to report and plan of care. No further questions or concerns at this time. Callbell within reach. Bed in lowest position. 0100 spoke with patient regarding breastfeeding, latching baby, using spoon and hand expressing. Pt is experiencing soreness and is concerned to go home before baby is latching properly. Encouraged pt to call for help next feed. 0315 Rounding complete; pt resting quietly. No needs at this time    0545 Pt sitting up in bed, breastfeeding baby. Pt states it is not hurting as badly as before and baby is latching well now. 0715 Bedside shift change report given to DESTINEE Mak LPN (oncoming nurse) by Iliana Zavala RN (offgoing nurse). Report included the following information SBAR, Intake/Output, MAR and Recent Results.

## 2022-06-26 NOTE — PROGRESS NOTES
Problem: Patient Education: Go to Patient Education Activity  Goal: Patient/Family Education  Outcome: Resolved/Met     Problem: Pain  Goal: *Control of Pain  Outcome: Resolved/Met     Problem: Patient Education: Go to Patient Education Activity  Goal: Patient/Family Education  Outcome: Resolved/Met     Problem: Vaginal Delivery: Postpartum Day 1  Goal: Activity/Safety  Outcome: Resolved/Met  Goal: Consults, if ordered  Outcome: Resolved/Met  Goal: Diagnostic Test/Procedures  Outcome: Resolved/Met  Goal: Nutrition/Diet  Outcome: Resolved/Met  Goal: Discharge Planning  Outcome: Resolved/Met  Goal: Medications  Outcome: Resolved/Met  Goal: Treatments/Interventions/Procedures  Outcome: Resolved/Met  Goal: Psychosocial  Outcome: Resolved/Met  Goal: *Vital signs within defined limits  Outcome: Resolved/Met  Goal: *Labs within defined limits  Outcome: Resolved/Met  Goal: *Hemodynamically stable  Outcome: Resolved/Met  Goal: *Optimal pain control at patient's stated goal  Outcome: Resolved/Met  Goal: *Participates in infant care  Outcome: Resolved/Met  Goal: *Demonstrates progressive activity  Outcome: Resolved/Met  Goal: *Performs self perineal care  Outcome: Resolved/Met  Goal: *Appropriate parent-infant bonding  Outcome: Resolved/Met  Goal: *Tolerating diet  Outcome: Resolved/Met  Goal: *Performs self breast care  Outcome: Resolved/Met     Problem: Vaginal Delivery: Postpartum 2  Goal: Activity/Safety  Outcome: Resolved/Met  Goal: Consults, if ordered  Outcome: Resolved/Met  Goal: Discharge Planning  Outcome: Resolved/Met  Goal: Medications  Outcome: Resolved/Met  Goal: Treatments/Interventions/Procedures  Outcome: Resolved/Met     Problem: Vaginal Delivery: Discharge Outcomes  Goal: *Verbalizes name, dosage, time, side effects, and number of days to continue medications  Outcome: Resolved/Met  Goal: *Describes available resources and support systems  Outcome: Resolved/Met  Goal: *No signs and symptoms of infection  Outcome: Resolved/Met  Goal: *Birth certificate information completed  Outcome: Resolved/Met  Goal: *Received and verbalizes understanding of discharge plan and instructions  Outcome: Resolved/Met  Goal: *Vital signs within defined limits  Outcome: Resolved/Met  Goal: *Labs within defined limits  Outcome: Resolved/Met  Goal: *Hemodynamically stable  Outcome: Resolved/Met  Goal: *Optimal pain control at patient's stated goal  Outcome: Resolved/Met  Goal: *Participates in infant care  Outcome: Resolved/Met  Goal: *Demonstrates progressive activity  Outcome: Resolved/Met  Goal: *Appropriate parent-infant bonding  Outcome: Resolved/Met  Goal: *Tolerating diet  Outcome: Resolved/Met